# Patient Record
Sex: FEMALE | Race: WHITE | NOT HISPANIC OR LATINO | Employment: OTHER | URBAN - METROPOLITAN AREA
[De-identification: names, ages, dates, MRNs, and addresses within clinical notes are randomized per-mention and may not be internally consistent; named-entity substitution may affect disease eponyms.]

---

## 2019-10-30 ENCOUNTER — OFFICE VISIT (OUTPATIENT)
Dept: GASTROENTEROLOGY | Facility: CLINIC | Age: 64
End: 2019-10-30
Payer: MEDICARE

## 2019-10-30 VITALS
HEART RATE: 70 BPM | WEIGHT: 167 LBS | DIASTOLIC BLOOD PRESSURE: 84 MMHG | HEIGHT: 66 IN | SYSTOLIC BLOOD PRESSURE: 130 MMHG | BODY MASS INDEX: 26.84 KG/M2

## 2019-10-30 DIAGNOSIS — K64.9 HEMORRHOIDS, UNSPECIFIED HEMORRHOID TYPE: Primary | ICD-10-CM

## 2019-10-30 DIAGNOSIS — K62.5 RECTAL BLEEDING: ICD-10-CM

## 2019-10-30 DIAGNOSIS — Z86.010 HISTORY OF COLON POLYPS: ICD-10-CM

## 2019-10-30 PROBLEM — Z86.0100 HISTORY OF COLON POLYPS: Status: ACTIVE | Noted: 2019-10-30

## 2019-10-30 PROCEDURE — 99214 OFFICE O/P EST MOD 30 MIN: CPT | Performed by: INTERNAL MEDICINE

## 2019-10-30 NOTE — LETTER
October 30, 2019     Lobo Hicks, [address] [facility] [address] [facility]    Patient: Devin Dee   YOB: 1955   Date of Visit: 10/30/2019       Dear Dr Rosa Brown: Thank you for referring Rhina Corbett to me for evaluation  Below are my notes for this consultation  If you have questions, please do not hesitate to call me  I look forward to following your patient along with you  Sincerely,        Florida Beauchamp MD        CC: No Recipients  Florida Beauchamp MD  10/30/2019  9:56 AM  Incomplete  [address] [facility] - Outpatient Follow-up Note  Devin Cea 75 y o  female MRN: [id number]  Encounter: [id number]    ASSESSMENT AND PLAN:      1  Hemorrhoids, unspecified hemorrhoid type  76-year-old female with rectal bleeding, likely secondary to hemorrhoids given her colonoscopy September 2018     - high-fiber diet, increased water intake  - I discussed with the patient the different options including conservative management with local creams versus surgery versus hemorrhoid banding  Patient would like to try the conservative management 1st     - Hydrocortisone (PROCTO-CATRACHO) 1 % CREA; Insert into the rectum 3 (three) times a day for 10 days  Dispense: 28 4 g; Refill: 2    2  Rectal bleeding    3  History of colon polyps  History of colon polyps  Last colonoscopy in September 27, 2018 negative for polyps  Five year recall, September 2023  Followup Appointment:  HILLARY hernández   ______________________________________________________________________    Chief Complaint   Patient presents with    Hemorrhoids     HPI:  76-year-old female here today for rectal bleeding  Last evaluated by our group in September 2018 for colonoscopy, personal history of colon polyps  Patient states that the rectal bleeding has been intermittent  She has gained some weight recently which she thinks is worsening her bleeding  Denies any constipation or diarrhea    Denies any harsh chemicals in the area  When the bleeding happens, usually last couple days  No abdominal pain  Review of systems significant for weight gain and rectal bleeding  All other systems negative  Historical Information   Past Medical History:   Diagnosis Date    Colon polyp     Hemorrhoids      Past Surgical History:   Procedure Laterality Date    CHOLECYSTECTOMY      COLONOSCOPY  09/27/2018    MOHS SURGERY      TUBAL LIGATION       Social History     Substance and Sexual Activity   Alcohol Use Yes    Frequency: 4 or more times a week     Social History     Substance and Sexual Activity   Drug Use Never     Social History     Tobacco Use   Smoking Status Never Smoker   Smokeless Tobacco Never Used     Family History   Problem Relation Age of Onset    Stroke Mother     Cancer Father     Stomach cancer Father     Colon cancer Neg Hx     Colon polyps Neg Hx          Current Outpatient Medications:     Hydrocortisone (PROCTO-CATRACHO) 1 % CREA  No Known Allergies  Reviewed medications and allergies and updated as indicated    PHYSICAL EXAM:    Blood pressure 130/84, pulse 70, height 5' 6" (1 676 m), weight 75 8 kg (167 lb)  Body mass index is 26 95 kg/m²  General Appearance: NAD, cooperative, alert  Eyes: Anicteric, PERRLA, EOMI  ENT:  Normocephalic, atraumatic, normal mucosa  Neck:  Supple, symmetrical, trachea midline  Resp:  Clear to auscultation bilaterally; no rales, rhonchi or wheezing; respirations unlabored   CV:  S1 S2, Regular rate and rhythm; no murmur, rub, or gallop  GI:  Soft, non-tender, non-distended; normal bowel sounds; no masses, no organomegaly   Rectal:  Grade 2 hemorrhoids  Musculoskeletal: No cyanosis, clubbing or edema  Normal ROM  Skin:  No jaundice, rashes, or lesions   Heme/Lymph: No palpable cervical lymphadenopathy  Psych: Normal affect, good eye contact  Neuro: No gross deficits, AAOx3    Lab Results:   Labs reviewed October 24th 2019  Normal TSH, CMP,   Negative hepatitis panel including hepatitis C antibody  Radiology Results:   No results found

## 2019-10-30 NOTE — PROGRESS NOTES
[address] [facility] - Outpatient Follow-up Note  Becky Addison 75 y o  female MRN: [id number]  Encounter: [id number]    ASSESSMENT AND PLAN:      1  Hemorrhoids, unspecified hemorrhoid type  47-year-old female with rectal bleeding, likely secondary to hemorrhoids given her colonoscopy September 2018     - high-fiber diet, increased water intake  - I discussed with the patient the different options including conservative management with local creams versus surgery versus hemorrhoid banding  Patient would like to try the conservative management 1st     - Hydrocortisone (PROCTO-CATRACHO) 1 % CREA; Insert into the rectum 3 (three) times a day for 10 days  Dispense: 28 4 g; Refill: 2    2  Rectal bleeding    3  History of colon polyps  History of colon polyps  Last colonoscopy in September 27, 2018 negative for polyps  Five year recall, September 2023  Followup Appointment:  HILLARY hernández   ______________________________________________________________________    Chief Complaint   Patient presents with    Hemorrhoids     HPI:  47-year-old female here today for rectal bleeding  Last evaluated by our group in September 2018 for colonoscopy, personal history of colon polyps  Patient states that the rectal bleeding has been intermittent  She has gained some weight recently which she thinks is worsening her bleeding  Denies any constipation or diarrhea  Denies any harsh chemicals in the area  When the bleeding happens, usually last couple days  No abdominal pain  Review of systems significant for weight gain and rectal bleeding  All other systems negative      Historical Information   Past Medical History:   Diagnosis Date    Colon polyp     Hemorrhoids      Past Surgical History:   Procedure Laterality Date    CHOLECYSTECTOMY      COLONOSCOPY  09/27/2018    MOHS SURGERY      TUBAL LIGATION       Social History     Substance and Sexual Activity   Alcohol Use Yes    Frequency: 4 or more times a week     Social History     Substance and Sexual Activity   Drug Use Never     Social History     Tobacco Use   Smoking Status Never Smoker   Smokeless Tobacco Never Used     Family History   Problem Relation Age of Onset    Stroke Mother     Cancer Father     Stomach cancer Father     Colon cancer Neg Hx     Colon polyps Neg Hx          Current Outpatient Medications:     Hydrocortisone (PROCTO-CATRACHO) 1 % CREA  No Known Allergies  Reviewed medications and allergies and updated as indicated    PHYSICAL EXAM:    Blood pressure 130/84, pulse 70, height 5' 6" (1 676 m), weight 75 8 kg (167 lb)  Body mass index is 26 95 kg/m²  General Appearance: NAD, cooperative, alert  Eyes: Anicteric, PERRLA, EOMI  ENT:  Normocephalic, atraumatic, normal mucosa  Neck:  Supple, symmetrical, trachea midline  Resp:  Clear to auscultation bilaterally; no rales, rhonchi or wheezing; respirations unlabored   CV:  S1 S2, Regular rate and rhythm; no murmur, rub, or gallop  GI:  Soft, non-tender, non-distended; normal bowel sounds; no masses, no organomegaly   Rectal:  Grade 2 hemorrhoids  Musculoskeletal: No cyanosis, clubbing or edema  Normal ROM  Skin:  No jaundice, rashes, or lesions   Heme/Lymph: No palpable cervical lymphadenopathy  Psych: Normal affect, good eye contact  Neuro: No gross deficits, AAOx3    Lab Results:   Labs reviewed October 24th 2019  Normal TSH, CMP,   Negative hepatitis panel including hepatitis C antibody  Radiology Results:   No results found

## 2020-02-19 ENCOUNTER — OFFICE VISIT (OUTPATIENT)
Dept: GASTROENTEROLOGY | Facility: CLINIC | Age: 65
End: 2020-02-19
Payer: MEDICARE

## 2020-02-19 VITALS
SYSTOLIC BLOOD PRESSURE: 120 MMHG | HEART RATE: 70 BPM | HEIGHT: 66 IN | WEIGHT: 167 LBS | DIASTOLIC BLOOD PRESSURE: 80 MMHG | BODY MASS INDEX: 26.84 KG/M2

## 2020-02-19 DIAGNOSIS — K64.9 HEMORRHOIDS, UNSPECIFIED HEMORRHOID TYPE: Primary | ICD-10-CM

## 2020-02-19 PROCEDURE — 46221 LIGATION OF HEMORRHOID(S): CPT | Performed by: INTERNAL MEDICINE

## 2020-02-19 NOTE — LETTER
February 19, 2020     [address]    Patient: Holland Arauz   YOB: 1955   Date of Visit: 2/19/2020       Dear Dr Jazmin Saavedra: Thank you for referring Cindy Alexander to me for evaluation  Below are my notes for this consultation  If you have questions, please do not hesitate to call me  I look forward to following your patient along with you  Sincerely,        Jareth Freeman MD        CC: No Recipients  Jareth Freeman MD  2/19/2020  3:57 PM  Sign at close encounter  [address] [facility] - [address] [facility] Rattie 75 y o  female MRN: [id number]  Encounter: [id number]    ASSESSMENT AND PLAN:    The left lateral hemorrhoid area was banded today  The patient was instructed to avoid constipation and straining, and educated in appropriate fiber intake  HPI: Holland Arauz is a 75y o  year old female who presents with rectal bleeding due to hemorrhoids       Previous treatments include: prep H  Bands were previously placed: none   Current Fiber intake: dietary  Complications of prior therapy include: none    The patient provided consent for banding  and was placed in the left lateral position  Rectal exam showed grade 3 hemorrhoids with mild rectal prolapse  The [place] ligator was advanced and a band was applied without difficulty   Repeat rectal exam confirmed appropriate placement  The patient was discharged home after observation in the waiting area  The exam was chaperoned by MA

## 2020-02-19 NOTE — PATIENT INSTRUCTIONS
Hemorrhoids   WHAT YOU NEED TO KNOW:   Hemorrhoids are swollen blood vessels inside your rectum (internal hemorrhoids) or on your anus (external hemorrhoids)  Sometimes a hemorrhoid may prolapse  This means it extends out of your anus  DISCHARGE INSTRUCTIONS:   Return to the emergency department if:   · You have severe pain in your rectum or around your anus  · You have severe pain in your abdomen and you are vomiting  · You have bleeding from your anus that soaks through your underwear  Contact your healthcare provider if:   · You have frequent and painful bowel movements  · Your hemorrhoid looks or feels more swollen than usual      · You do not have a bowel movement for 2 days or more  · You see or feel tissue coming through your anus  · You have questions or concerns about your condition or care  Medicines: You may  need any of the following:  · A pad, cream, or ointment  can help decrease pain, swelling, and itching  · Stool softeners  help treat or prevent constipation  · NSAIDs , such as ibuprofen, help decrease swelling, pain, and fever  NSAIDs can cause stomach bleeding or kidney problems in certain people  If you take blood thinner medicine, always ask your healthcare provider if NSAIDs are safe for you  Always read the medicine label and follow directions  · Take your medicine as directed  Contact your healthcare provider if you think your medicine is not helping or if you have side effects  Tell him or her if you are allergic to any medicine  Keep a list of the medicines, vitamins, and herbs you take  Include the amounts, and when and why you take them  Bring the list or the pill bottles to follow-up visits  Carry your medicine list with you in case of an emergency  Manage your symptoms:   · Apply ice on your anus for 15 to 20 minutes every hour or as directed  Use an ice pack, or put crushed ice in a plastic bag   Cover it with a towel before you apply it to your anus  Ice helps prevent tissue damage and decreases swelling and pain  · Take a sitz bath  Fill a bathtub with 4 to 6 inches of warm water  You may also use a sitz bath pan that fits inside a toilet bowl  Sit in the sitz bath for 15 minutes  Do this 3 times a day, and after each bowel movement  The warm water can help decrease pain and swelling  · Keep your anal area clean  Gently wash the area with warm water daily  Soap may irritate the area  After a bowel movement, wipe with moist towelettes or wet toilet paper  Dry toilet paper can irritate the area  Prevent hemorrhoids:   · Do not strain to have a bowel movement  Do not sit on the toilet too long  These actions can increase pressure on the tissues in your rectum and anus  · Drink plenty of liquids  Liquids can help prevent constipation  Ask how much liquid to drink each day and which liquids are best for you  · Eat a variety of high-fiber foods  Examples include fruits, vegetables, and whole grains  Ask your healthcare provider how much fiber you need each day  You may need to take a fiber supplement  · Exercise as directed  Exercise, such as walking, may make it easier to have a bowel movement  Ask your healthcare provider to help you create an exercise plan  · Do not have anal sex  Anal sex can weaken the skin around your rectum and anus  · Avoid heavy lifting  This can cause straining and increase your risk for another hemorrhoid  Follow up with your healthcare provider as directed:  Write down your questions so you remember to ask them during your visits  © 2017 [address] [facility] Information is for End User's use only and may not be sold, redistributed or otherwise used for commercial purposes  All illustrations and images included in CareNotes® are the copyrighted property of [employer] , [employer]  or Farhat Welch  The above information is an  only   It is not intended as medical advice for individual conditions or treatments  Talk to your doctor, nurse or pharmacist before following any medical regimen to see if it is safe and effective for you

## 2020-02-19 NOTE — PROGRESS NOTES
[address] [facility] - [address]  [facility] 75 y o  female MRN: [id number]  Encounter: [id number]    ASSESSMENT AND PLAN:    The left lateral hemorrhoid area was banded today  The patient was instructed to avoid constipation and straining, and educated in appropriate fiber intake  HPI: Mary Moyer is a 75y o  year old female who presents with rectal bleeding due to hemorrhoids       Previous treatments include: prep H  Bands were previously placed: none   Current Fiber intake: dietary  Complications of prior therapy include: none    The patient provided consent for banding  and was placed in the left lateral position  Rectal exam showed grade 3 hemorrhoids with mild rectal prolapse  The O'[place]van ligator was advanced and a band was applied without difficulty   Repeat rectal exam confirmed appropriate placement  The patient was discharged home after observation in the waiting area  The exam was chaperoned by MA

## 2020-03-10 ENCOUNTER — OFFICE VISIT (OUTPATIENT)
Dept: GASTROENTEROLOGY | Facility: CLINIC | Age: 65
End: 2020-03-10
Payer: MEDICARE

## 2020-03-10 VITALS
HEART RATE: 67 BPM | WEIGHT: 167 LBS | HEIGHT: 66 IN | BODY MASS INDEX: 26.84 KG/M2 | SYSTOLIC BLOOD PRESSURE: 134 MMHG | DIASTOLIC BLOOD PRESSURE: 94 MMHG

## 2020-03-10 DIAGNOSIS — K64.9 HEMORRHOIDS, UNSPECIFIED HEMORRHOID TYPE: Primary | ICD-10-CM

## 2020-03-10 PROCEDURE — 46221 LIGATION OF HEMORRHOID(S): CPT | Performed by: INTERNAL MEDICINE

## 2020-03-10 NOTE — PATIENT INSTRUCTIONS
Hemorrhoids   WHAT YOU NEED TO KNOW:   Hemorrhoids are swollen blood vessels inside your rectum (internal hemorrhoids) or on your anus (external hemorrhoids)  Sometimes a hemorrhoid may prolapse  This means it extends out of your anus  DISCHARGE INSTRUCTIONS:   Return to the emergency department if:   · You have severe pain in your rectum or around your anus  · You have severe pain in your abdomen and you are vomiting  · You have bleeding from your anus that soaks through your underwear  Contact your healthcare provider if:   · You have frequent and painful bowel movements  · Your hemorrhoid looks or feels more swollen than usual      · You do not have a bowel movement for 2 days or more  · You see or feel tissue coming through your anus  · You have questions or concerns about your condition or care  Medicines: You may  need any of the following:  · A pad, cream, or ointment  can help decrease pain, swelling, and itching  · Stool softeners  help treat or prevent constipation  · NSAIDs , such as ibuprofen, help decrease swelling, pain, and fever  NSAIDs can cause stomach bleeding or kidney problems in certain people  If you take blood thinner medicine, always ask your healthcare provider if NSAIDs are safe for you  Always read the medicine label and follow directions  · Take your medicine as directed  Contact your healthcare provider if you think your medicine is not helping or if you have side effects  Tell him or her if you are allergic to any medicine  Keep a list of the medicines, vitamins, and herbs you take  Include the amounts, and when and why you take them  Bring the list or the pill bottles to follow-up visits  Carry your medicine list with you in case of an emergency  Manage your symptoms:   · Apply ice on your anus for 15 to 20 minutes every hour or as directed  Use an ice pack, or put crushed ice in a plastic bag   Cover it with a towel before you apply it to your anus  Ice helps prevent tissue damage and decreases swelling and pain  · Take a sitz bath  Fill a bathtub with 4 to 6 inches of warm water  You may also use a sitz bath pan that fits inside a toilet bowl  Sit in the sitz bath for 15 minutes  Do this 3 times a day, and after each bowel movement  The warm water can help decrease pain and swelling  · Keep your anal area clean  Gently wash the area with warm water daily  Soap may irritate the area  After a bowel movement, wipe with moist towelettes or wet toilet paper  Dry toilet paper can irritate the area  Prevent hemorrhoids:   · Do not strain to have a bowel movement  Do not sit on the toilet too long  These actions can increase pressure on the tissues in your rectum and anus  · Drink plenty of liquids  Liquids can help prevent constipation  Ask how much liquid to drink each day and which liquids are best for you  · Eat a variety of high-fiber foods  Examples include fruits, vegetables, and whole grains  Ask your healthcare provider how much fiber you need each day  You may need to take a fiber supplement  · Exercise as directed  Exercise, such as walking, may make it easier to have a bowel movement  Ask your healthcare provider to help you create an exercise plan  · Do not have anal sex  Anal sex can weaken the skin around your rectum and anus  · Avoid heavy lifting  This can cause straining and increase your risk for another hemorrhoid  Follow up with your healthcare provider as directed:  Write down your questions so you remember to ask them during your visits  © 2017 [address] [facility] Information is for End User's use only and may not be sold, redistributed or otherwise used for commercial purposes  All illustrations and images included in CareNotes® are the copyrighted property of [employer] , [employer]  or Farhat Welch  The above information is an  only   It is not intended as medical advice for individual conditions or treatments  Talk to your doctor, nurse or pharmacist before following any medical regimen to see if it is safe and effective for you

## 2020-03-10 NOTE — PROGRESS NOTES
[facility] - [address] [place] [address] Char 75 y o  female MRN: [id number]  Encounter: [id number]    ASSESSMENT AND PLAN:    The right anterior hemorrhoid area was banded today  The patient was instructed to avoid constipation and straining, and educated in appropriate fiber intake  HPI: Hasmukh Licona is a 75y o  year old female who presents with rectal bleeding due to hemorrhoids       Previous treatments include:  Preparation H  Bands were previously placed:  Left lateral   Current Fiber intake:  Dietary  Complications of prior therapy include:  None    The patient provided consent for banding  and was placed in the left lateral position  Rectal exam showed grade 2 hemorrhoids  The O'Ivan ligator was advanced and a band was applied without difficulty   Repeat rectal exam confirmed appropriate placement  The patient was discharged home after observation in the waiting area  The exam was chaperoned by MA

## 2020-03-10 NOTE — LETTER
March 10, 2020     [address]    Patient: Natalia Bender   YOB: 1955   Date of Visit: 3/10/2020       Dear Dr Catracho Walsh: Thank you for referring Shane Brumfield to me for evaluation  Below are my notes for this consultation  If you have questions, please do not hesitate to call me  I look forward to following your patient along with you  Sincerely,        Emanuel Pennington MD        CC: No Recipients  Emanuel Pennington MD  3/10/2020  1:32 PM  Signed  [address] [facility] - [address] [place] [address] 75 y o  female MRN: [id number]  Encounter: [id number]    ASSESSMENT AND PLAN:    The right anterior hemorrhoid area was banded today  The patient was instructed to avoid constipation and straining, and educated in appropriate fiber intake  HPI: Natalia Bender is a 75y o  year old female who presents with rectal bleeding due to hemorrhoids       Previous treatments include:  Preparation H  Bands were previously placed:  Left lateral   Current Fiber intake:  Dietary  Complications of prior therapy include:  None    The patient provided consent for banding  and was placed in the left lateral position  Rectal exam showed grade 2 hemorrhoids  The O'Ivan ligator was advanced and a band was applied without difficulty   Repeat rectal exam confirmed appropriate placement  The patient was discharged home after observation in the waiting area  The exam was chaperoned by MA

## 2020-05-11 ENCOUNTER — OFFICE VISIT (OUTPATIENT)
Dept: GASTROENTEROLOGY | Facility: CLINIC | Age: 65
End: 2020-05-11
Payer: MEDICARE

## 2020-05-11 VITALS
SYSTOLIC BLOOD PRESSURE: 134 MMHG | HEART RATE: 87 BPM | DIASTOLIC BLOOD PRESSURE: 86 MMHG | BODY MASS INDEX: 26.03 KG/M2 | HEIGHT: 66 IN | WEIGHT: 162 LBS

## 2020-05-11 DIAGNOSIS — K64.9 HEMORRHOIDS, UNSPECIFIED HEMORRHOID TYPE: Primary | ICD-10-CM

## 2020-05-11 PROCEDURE — 46221 LIGATION OF HEMORRHOID(S): CPT | Performed by: INTERNAL MEDICINE

## 2020-08-12 ENCOUNTER — OFFICE VISIT (OUTPATIENT)
Dept: GASTROENTEROLOGY | Facility: CLINIC | Age: 65
End: 2020-08-12
Payer: MEDICARE

## 2020-08-12 VITALS
DIASTOLIC BLOOD PRESSURE: 72 MMHG | HEIGHT: 66 IN | WEIGHT: 160 LBS | SYSTOLIC BLOOD PRESSURE: 120 MMHG | BODY MASS INDEX: 25.71 KG/M2 | TEMPERATURE: 97.7 F | HEART RATE: 76 BPM

## 2020-08-12 DIAGNOSIS — Z86.010 HISTORY OF COLON POLYPS: ICD-10-CM

## 2020-08-12 DIAGNOSIS — K64.8 OTHER HEMORRHOIDS: Primary | ICD-10-CM

## 2020-08-12 DIAGNOSIS — K62.5 RECTAL BLEEDING: ICD-10-CM

## 2020-08-12 PROCEDURE — 99213 OFFICE O/P EST LOW 20 MIN: CPT | Performed by: INTERNAL MEDICINE

## 2020-08-12 NOTE — LETTER
August 12, 2020     W[address]y [address]    Patient: Axel Blake   YOB: 1955   Date of Visit: 8/12/2020       Dear Dr Michael Saha: Thank you for referring Girma Pace to me for evaluation  Below are my notes for this consultation  If you have questions, please do not hesitate to call me  I look forward to following your patient along with you  Sincerely,        Falguni Schuster MD        CC: No Recipients  Falguni Schuster MD  8/12/2020 12:11 PM  Sign when Signing Visit  [place] [address] [facility] - Outpatient Follow-up Note  Axel Blake 77 y o  female MRN: [id number]  Encounter: [id number]    ASSESSMENT AND PLAN:      1  Other hemorrhoids  24-year-old female here today for follow-up  Rectal bleeding has significantly improved after banding all 3 hemorrhoids  We emphasized importance of good total history habits, increasing fiber and water, avoiding straining and pushing  2  Rectal bleeding  due to hemorrhoids, status post banding of all 3 columns    3  History of colon polyps  History of colon polyps, last colonoscopy in September 2018  Recall September 2023      Followup Appointment:  HILLARY hernández   ______________________________________________________________________    Chief Complaint   Patient presents with    Follow-up     Hemorrhoids     HPI:  24-year-old female here today for follow-up post procedures of hemorrhoid banding  Doing better  Did see couple small red blood on the toilet paper after wiping  Overall though doing well  Stools are soft  Tries not to strain or push  No bleeding otherwise  No abdominal pains  Weight is stable  Trying to add fiber to her diet      Historical Information   Past Medical History:   Diagnosis Date    Colon polyp     Hemorrhoids      Past Surgical History:   Procedure Laterality Date    CHOLECYSTECTOMY      COLONOSCOPY  09/27/2018    MOHS SURGERY      TUBAL LIGATION Social History     Substance and Sexual Activity   Alcohol Use Yes    Frequency: 4 or more times a week     Social History     Substance and Sexual Activity   Drug Use Never     Social History     Tobacco Use   Smoking Status Former Smoker   Smokeless Tobacco Never Used     Family History   Problem Relation Age of Onset    Stroke Mother     Cancer Father     Stomach cancer Father     Colon cancer Neg Hx     Colon polyps Neg Hx          Current Outpatient Medications:     Hydrocortisone (PROCTO-CATRACHO) 1 % CREA  No Known Allergies  Reviewed medications and allergies and updated as indicated    PHYSICAL EXAM:    Blood pressure 120/72, pulse 76, temperature 97 7 °F (36 5 °C), height 5' 6" (1 676 m), weight 72 6 kg (160 lb)  Body mass index is 25 82 kg/m²  General Appearance: NAD, cooperative, alert  Eyes: Anicteric, PERRLA, EOMI  ENT:  Normocephalic, atraumatic, normal mucosa  Neck:  Supple, symmetrical, trachea midline  Resp:  Clear to auscultation bilaterally; no rales, rhonchi or wheezing; respirations unlabored   CV:  S1 S2, Regular rate and rhythm; no murmur, rub, or gallop  GI:  Soft, non-tender, non-distended; normal bowel sounds; no masses, no organomegaly   Rectal: Deferred  Musculoskeletal: No cyanosis, clubbing or edema  Normal ROM    Skin:  No jaundice, rashes, or lesions   Heme/Lymph: No palpable cervical lymphadenopathy  Psych: Normal affect, good eye contact  Neuro: No gross deficits, AAOx3

## 2020-08-12 NOTE — PROGRESS NOTES
[id number] - Outpatient Follow-up Note  Norm Mor 77 y o  female MRN: [id number]  Encounter: [id number]    ASSESSMENT AND PLAN:      1  Other hemorrhoids  25-year-old female here today for follow-up  Rectal bleeding has significantly improved after banding all 3 hemorrhoids  We emphasized importance of good total history habits, increasing fiber and water, avoiding straining and pushing  2  Rectal bleeding  due to hemorrhoids, status post banding of all 3 columns    3  History of colon polyps  History of colon polyps, last colonoscopy in September 2018  Recall September 2023      Followup Appointment:  [facility] judd n   ______________________________________________________________________    Chief Complaint   Patient presents with    Follow-up     Hemorrhoids     HPI:  25-year-old female here today for follow-up post procedures of hemorrhoid banding  Doing better  Did see couple small red blood on the toilet paper after wiping  Overall though doing well  Stools are soft  Tries not to strain or push  No bleeding otherwise  No abdominal pains  Weight is stable  Trying to add fiber to her diet      Historical Information   Past Medical History:   Diagnosis Date    Colon polyp     Hemorrhoids      Past Surgical History:   Procedure Laterality Date    CHOLECYSTECTOMY      COLONOSCOPY  09/27/2018    MOHS SURGERY      TUBAL LIGATION       Social History     Substance and Sexual Activity   Alcohol Use Yes    Frequency: 4 or more times a week     Social History     Substance and Sexual Activity   Drug Use Never     Social History     Tobacco Use   Smoking Status Former Smoker   Smokeless Tobacco Never Used     Family History   Problem Relation Age of Onset    Stroke Mother     Cancer Father     Stomach cancer Father     Colon cancer Neg Hx     Colon polyps Neg Hx          Current Outpatient Medications:     Hydrocortisone (PROCTO-CATRACHO) 1 % CREA  No Known Allergies  Reviewed medications and allergies and updated as indicated    PHYSICAL EXAM:    Blood pressure 120/72, pulse 76, temperature 97 7 °F (36 5 °C), height 5' 6" (1 676 m), weight 72 6 kg (160 lb)  Body mass index is 25 82 kg/m²  General Appearance: NAD, cooperative, alert  Eyes: Anicteric, PERRLA, EOMI  ENT:  Normocephalic, atraumatic, normal mucosa  Neck:  Supple, symmetrical, trachea midline  Resp:  Clear to auscultation bilaterally; no rales, rhonchi or wheezing; respirations unlabored   CV:  S1 S2, Regular rate and rhythm; no murmur, rub, or gallop  GI:  Soft, non-tender, non-distended; normal bowel sounds; no masses, no organomegaly   Rectal: Deferred  Musculoskeletal: No cyanosis, clubbing or edema  Normal ROM    Skin:  No jaundice, rashes, or lesions   Heme/Lymph: No palpable cervical lymphadenopathy  Psych: Normal affect, good eye contact  Neuro: No gross deficits, AAOx3

## 2021-03-04 DIAGNOSIS — Z23 ENCOUNTER FOR IMMUNIZATION: ICD-10-CM

## 2021-04-02 ENCOUNTER — TELEPHONE (OUTPATIENT)
Dept: GASTROENTEROLOGY | Facility: CLINIC | Age: 66
End: 2021-04-02

## 2021-04-02 NOTE — TELEPHONE ENCOUNTER
Pt left [facility] stating a few mos ago GS did banding  Today she had BM w/ a toilet full of blood; is unsure what to do  # [phone]

## 2021-04-02 NOTE — TELEPHONE ENCOUNTER
Pt has hx of banding in the past   She did have another BM today and there was no blood  Bright red blood in toilet, not in stool  Initially some rectal pain when BM started and beginning part of stool was hard but then soft  No fever, chills, nausea, vomiting  Not dizzy, lightheaded, or feeling  faint  No change in bowel habits  No change in urination  Advised to try stool softener  Advised when to go to ER and/or call on call doctor  Patient has appt in June  Advised if this occurs again or symptoms worsen she needs to let us know

## 2021-04-13 ENCOUNTER — TELEPHONE (OUTPATIENT)
Dept: GASTROENTEROLOGY | Facility: CLINIC | Age: 66
End: 2021-04-13

## 2021-04-13 NOTE — TELEPHONE ENCOUNTER
Spoke w pt  Advised patient tries some high fiber diet, adding Miralax 17g daily and f/u as planned

## 2021-04-13 NOTE — TELEPHONE ENCOUNTER
Spoke with patient on cell [phone]  Had tried prunes as advised and this did improve the constipation  Not on any stool softeners or other laxatives  No bleeding  Stools are now "narrow"  Does have some generalized abdominal pain/cramping worse in the morning  Her appointment is not until June for f/u  Please advise

## 2021-04-13 NOTE — TELEPHONE ENCOUNTER
Pt called back today  She she is not having any rectal bleeding  However she is now having pencil like stools  She does have some pain and cramping  But its not extreme

## 2021-04-14 NOTE — TELEPHONE ENCOUNTER
Pt left VM stating she spoke w/ GS yesterday and swore she said she should take one tablet of Miralax at night; checked but does not come in tabs only powder  [phone]

## 2021-04-14 NOTE — TELEPHONE ENCOUNTER
I returned call and spoke with patient reviewing that MiraLax is a powder and not a tablet  Patient will comply

## 2021-04-28 NOTE — TELEPHONE ENCOUNTER
Pt left  mssg stating GS told her to take laxative every day; knows she's not supposed to take it for long/questions how long she should have taken?/is all out; didn't notice much of a difference on days she took it from days she didn't  # [phone]

## 2021-04-28 NOTE — TELEPHONE ENCOUNTER
I contacted patient  She is no longer having issues with constipation but she is still concerned that her stools are thin, narrow (not her usual)  She is concerned because she researched the internet  She would like your input  I did advise we will get back to her tomorrow  I did advise that she does not need to continue with daily MiraLax, she can use prn

## 2021-04-29 NOTE — TELEPHONE ENCOUNTER
I spoke with patient and conveyed GS recommendation to continue on MiraLax daily and she will address/answer her concerns at Yu office visit

## 2021-06-16 ENCOUNTER — CONSULT (OUTPATIENT)
Dept: GASTROENTEROLOGY | Facility: CLINIC | Age: 66
End: 2021-06-16
Payer: MEDICARE

## 2021-06-16 ENCOUNTER — TELEPHONE (OUTPATIENT)
Dept: GASTROENTEROLOGY | Facility: CLINIC | Age: 66
End: 2021-06-16

## 2021-06-16 VITALS
WEIGHT: 163 LBS | BODY MASS INDEX: 26.2 KG/M2 | HEIGHT: 66 IN | SYSTOLIC BLOOD PRESSURE: 124 MMHG | DIASTOLIC BLOOD PRESSURE: 78 MMHG

## 2021-06-16 DIAGNOSIS — R19.4 CHANGE IN BOWEL HABITS: Primary | ICD-10-CM

## 2021-06-16 DIAGNOSIS — K76.89 LIVER CYST: ICD-10-CM

## 2021-06-16 DIAGNOSIS — Z86.010 HISTORY OF COLON POLYPS: ICD-10-CM

## 2021-06-16 DIAGNOSIS — K76.0 FATTY LIVER: ICD-10-CM

## 2021-06-16 DIAGNOSIS — R12 HEARTBURN: ICD-10-CM

## 2021-06-16 PROCEDURE — 99214 OFFICE O/P EST MOD 30 MIN: CPT | Performed by: INTERNAL MEDICINE

## 2021-06-16 RX ORDER — POLYETHYLENE GLYCOL 3350 17 G/17G
17 POWDER, FOR SOLUTION ORAL DAILY PRN
COMMUNITY

## 2021-06-16 NOTE — H&P (VIEW-ONLY)
[id number] - Outpatient Follow-up Note  Goldie Locke 77 y o  female MRN: [id number]  Encounter: [id number]    ASSESSMENT AND PLAN:      1  Change in bowel habits  65F referred to us from Dr Christel Noel for changes in bowel habits and heartburn  Main issue seems to be that she has "pencil thin stools" at times w rectal bleeding  Other times no issues  Probably some constipation, but will proceed with colonoscopy to further assess  Continue Miralax daily     - Schedule Colonoscopy @ [address]  2  Heartburn  Intermittent issues requiring 6 weeks of PPI therapy  Currently feeling better off PPI  However, this has been going on for many years  - GERD lifestyle modifications  - Schedule EGD @ [address] to rule out Barretts  3  Liver cyst  Noted on US previously  Will follow up in 6 months  Likely benign     - US abdomen limited; Future    4  Fatty liver  Noted on US  Weight loss and limit ETOH intake  - Comprehensive metabolic panel; Future  - HCV FIBROSURE; Future  - Hepatitis C antibody; Future    - vitamin E 600 UNIT capsule; Take 1 capsule (600 Units total) by mouth daily  Dispense: 30 capsule; Refill: 11    5  History of colon polyps  Due 9/2023      Followup Appointment: 6 months  ______________________________________________________________________    Chief Complaint   Patient presents with    narrow stools    Heartburn     HPI:  65F here today at the request of Dr Christel Noel for change in bowel habits and heartburn  Pt states recently she's noticed some bowel movements that are harder to pass with rectal bleeding  She has known hemorrhoids so initially assumed that to be the case  However, then she started having sensation of incomplete bowel movements with pencil thin stools  No weight loss  No diarrhea  No abd pain/n/v  She also has had several episodes of heartburn requiring PPI or H2RA trials  Recently completed a 6 week course w improvement in her symptoms  Never had an EGD  No dysphagia/odynophagia  No N/V/regurgitation  Historical Information   Past Medical History:   Diagnosis Date    Breast cancer ([address] )     Colon polyp     Fatty liver     GERD (gastroesophageal reflux disease)     Hemorrhoids     Hypertension     exercise induced, on no meds     Past Surgical History:   Procedure Laterality Date    BREAST LUMPECTOMY      CHOLECYSTECTOMY      COLONOSCOPY  09/27/2018    MOHS SURGERY      TUBAL LIGATION       Social History     Substance and Sexual Activity   Alcohol Use Yes     Social History     Substance and Sexual Activity   Drug Use Never     Social History     Tobacco Use   Smoking Status Former Smoker   Smokeless Tobacco Never Used     Family History   Problem Relation Age of Onset    Stroke Mother     Cancer Father     Stomach cancer Father     Colon cancer Neg Hx     Colon polyps Neg Hx          Current Outpatient Medications:     polyethylene glycol (MIRALAX) 17 g packet    vitamin E 600 UNIT capsule  No Known Allergies  Reviewed medications and allergies and updated as indicated    PHYSICAL EXAM:    Blood pressure 124/78, height 5' 6" (1 676 m), weight 73 9 kg (163 lb)  Body mass index is 26 31 kg/m²  General Appearance: NAD, cooperative, alert  Eyes: Anicteric, PERRLA, EOMI  ENT:  Normocephalic, atraumatic, normal mucosa  Neck:  Supple, symmetrical, trachea midline  Resp:  Clear to auscultation bilaterally; no rales, rhonchi or wheezing; respirations unlabored   CV:  S1 S2, Regular rate and rhythm; no murmur, rub, or gallop  GI:  Soft, non-tender, non-distended; normal bowel sounds; no masses, no organomegaly   Rectal: Deferred  Musculoskeletal: No cyanosis, clubbing or edema  Normal ROM    Skin:  No jaundice, rashes, or lesions   Heme/Lymph: No palpable cervical lymphadenopathy  Psych: Normal affect, good eye contact  Neuro: No gross deficits, AAOx3    Lab Results: Normal CMP/CBC from 8/2020    Radiology Results:   [address] 3/12/21 - liver cysts, some fatty liver

## 2021-06-16 NOTE — PROGRESS NOTES
[facility] - Outpatient Follow-up Note  Axel Blake 77 y o  female MRN: [id number]  Encounter: [id number]    ASSESSMENT AND PLAN:      1  Change in bowel habits  65F referred to us from Dr Michael Saha for changes in bowel habits and heartburn  Main issue seems to be that she has "pencil thin stools" at times w rectal bleeding  Other times no issues  Probably some constipation, but will proceed with colonoscopy to further assess  Continue Miralax daily     - Schedule Colonoscopy @ [address]  2  Heartburn  Intermittent issues requiring 6 weeks of PPI therapy  Currently feeling better off PPI  However, this has been going on for many years  - GERD lifestyle modifications  - Schedule EGD @ [address] to rule out Barretts  3  Liver cyst  Noted on US previously  Will follow up in 6 months  Likely benign     - US abdomen limited; Future    4  Fatty liver  Noted on US  Weight loss and limit ETOH intake  - Comprehensive metabolic panel; Future  - HCV FIBROSURE; Future  - Hepatitis C antibody; Future    - vitamin E 600 UNIT capsule; Take 1 capsule (600 Units total) by mouth daily  Dispense: 30 capsule; Refill: 11    5  History of colon polyps  Due 9/2023      Followup Appointment: 6 months  ______________________________________________________________________    Chief Complaint   Patient presents with    narrow stools    Heartburn     HPI:  65F here today at the request of Dr Michael Saha for change in bowel habits and heartburn  Pt states recently she's noticed some bowel movements that are harder to pass with rectal bleeding  She has known hemorrhoids so initially assumed that to be the case  However, then she started having sensation of incomplete bowel movements with pencil thin stools  No weight loss  No diarrhea  No abd pain/n/v  She also has had several episodes of heartburn requiring PPI or H2RA trials  Recently completed a 6 week course w improvement in her symptoms  Never had an EGD  No dysphagia/odynophagia  No N/V/regurgitation  Historical Information   Past Medical History:   Diagnosis Date    Breast cancer ([address] )     Colon polyp     Fatty liver     GERD (gastroesophageal reflux disease)     Hemorrhoids     Hypertension     exercise induced, on no meds     Past Surgical History:   Procedure Laterality Date    BREAST LUMPECTOMY      CHOLECYSTECTOMY      COLONOSCOPY  09/27/2018    MOHS SURGERY      TUBAL LIGATION       Social History     Substance and Sexual Activity   Alcohol Use Yes     Social History     Substance and Sexual Activity   Drug Use Never     Social History     Tobacco Use   Smoking Status Former Smoker   Smokeless Tobacco Never Used     Family History   Problem Relation Age of Onset    Stroke Mother     Cancer Father     Stomach cancer Father     Colon cancer Neg Hx     Colon polyps Neg Hx          Current Outpatient Medications:     polyethylene glycol (MIRALAX) 17 g packet    vitamin E 600 UNIT capsule  No Known Allergies  Reviewed medications and allergies and updated as indicated    PHYSICAL EXAM:    Blood pressure 124/78, height 5' 6" (1 676 m), weight 73 9 kg (163 lb)  Body mass index is 26 31 kg/m²  General Appearance: NAD, cooperative, alert  Eyes: Anicteric, PERRLA, EOMI  ENT:  Normocephalic, atraumatic, normal mucosa  Neck:  Supple, symmetrical, trachea midline  Resp:  Clear to auscultation bilaterally; no rales, rhonchi or wheezing; respirations unlabored   CV:  S1 S2, Regular rate and rhythm; no murmur, rub, or gallop  GI:  Soft, non-tender, non-distended; normal bowel sounds; no masses, no organomegaly   Rectal: Deferred  Musculoskeletal: No cyanosis, clubbing or edema  Normal ROM    Skin:  No jaundice, rashes, or lesions   Heme/Lymph: No palpable cervical lymphadenopathy  Psych: Normal affect, good eye contact  Neuro: No gross deficits, AAOx3    Lab Results: Normal CMP/CBC from 8/2020    Radiology Results:   [address] 3/12/21 - liver cysts, some fatty liver

## 2021-06-16 NOTE — PATIENT INSTRUCTIONS
Non-Alcoholic Fatty Liver Disease   WHAT YOU NEED TO KNOW:   Non-alcoholic fatty liver disease (NAFLD) is a buildup of fat in your liver from a condition other than alcoholism  DISCHARGE INSTRUCTIONS:   Medicines:   · Medicines  may be given to manage blood sugar or cholesterol levels  · Take your medicine as directed  Contact your healthcare provider if you think your medicine is not helping or if you have side effects  Tell him or her if you are allergic to any medicine  Keep a list of the medicines, vitamins, and herbs you take  Include the amounts, and when and why you take them  Bring the list or the pill bottles to follow-up visits  Carry your medicine list with you in case of an emergency  Follow up with your healthcare provider as directed: You may need to return for more tests  You may also be referred to a specialist  Write down your questions so you remember to ask them during your visits  Manage NAFLD:   · Maintain a healthy weight  Ask your healthcare provider how much you should weigh  Ask him to help you create a weight loss plan if you are overweight  · Exercise  Aerobic exercise 3 times a week for 20 to 45 minutes can help decrease fat buildup in your liver  Examples are cycling, brisk walking, and jogging  Ask your healthcare provider about the best exercise plan for you  · Eat healthy foods  Examples are vegetables, fruit, whole-grain breads, low-fat dairy products, beans, lean meats, and fish  Foods low in simple carbohydrates, high fructose corn syrup, and trans fat may help decrease fat buildup in your liver  · Do not drink alcohol  Alcohol may make NAFLD worse and harm your liver  Contact your healthcare provider if:   · You have increased pain or swelling in your abdomen  · You feel more tired than usual     · You bruise or bleed easily  · Your skin or the whites of your eyes look yellow      · You have questions or concerns about your condition or care     Return to the emergency department if:   · You have shortness of breath  · You have trouble thinking clearly or are confused  · You feel lightheaded or faint  · You have shaking, chills, and a fever  © Copyright [address] Information is for End User's use only and may not be sold, redistributed or otherwise used for commercial purposes  All illustrations and images included in CareNotes® are the copyrighted property of A D A M , Inc  or [address] [place], [place] [address]khloe Coffey   The above information is an  only  It is not intended as medical advice for individual conditions or treatments  Talk to your doctor, nurse or pharmacist before following any medical regimen to see if it is safe and effective for you

## 2021-06-16 NOTE — TELEPHONE ENCOUNTER
Why does your doctor want you to have this procedure? Change in bowel     Do you have kidney disease?  no  If yes, are you on dialysis :     Have you had diverticulitis within the past 2 months? no    Are you diabetic?  no  If yes, insulin dependent: If yes, provide diabetic instructions sheet     Do take iron supplements?  no  If yes, instruct patient to hold iron supplement for 7 days prior    Are you on a blood thinner? no   Was the blood thinner sheet complete and faxed to cardiologist no  Plavix (clopidogrel), Coumadin (warfarin), Lovenox (enoxaparin), Xarelto (rivaroxaban), Pradaxa(dabigatran), Eliquis(apixaban) Savaysa/Lixiana (edoxapan)    Do you have an automatic implantable cardiac defibrillator (AICD)/pacemaker ([facility])? no  Was AICD/pacemaker sheet completed and faxed to cardiologist? no    Are you on home oxygen? no  If yes, continuous or nocturnal:     Have you been treated for MRSA, VRE or any communicable diseases? no    Heart attack, stroke, or stent within 3 months? no  Schedule at Hospital if within 3-6 months   Use nitroglycerin for chest pain in the last 6 months? no    History of organ  transplant?  no   If yes, notify Endo      History of neck/throat/tongue surgery or cancer? no  IF yes, notify Endo      Any problems with anesthesia in the past? no     Was stool C diff ordered?  no Stool specimen needs to be completed prior to procedure    Do have any facial or body piercings?no     Do you have a latex allergy? no     Do have an allergy to metals? ([facility] study only) no     If pediatric patient, was consent faxed to pediatrician no     Patient rights reviewed yes        Miralax prep given and instructions emailed to patient

## 2021-06-18 ENCOUNTER — TELEPHONE (OUTPATIENT)
Dept: GASTROENTEROLOGY | Facility: CLINIC | Age: 66
End: 2021-06-18

## 2021-06-18 NOTE — TELEPHONE ENCOUNTER
Patient had visit with you 6/16/21  She has questions:   1  She takes multivitamin includes Vit # 15 8 mg, do you still want her to take additional as ordered? 2  Can she take the MiraLax as needed versus daily since she has frequently has loose stools? Reply can be sent via [address]

## 2021-07-06 ENCOUNTER — HOSPITAL ENCOUNTER (OUTPATIENT)
Dept: GASTROENTEROLOGY | Facility: AMBULATORY SURGERY CENTER | Age: 66
Discharge: HOME | End: 2021-07-06
Payer: MEDICARE

## 2021-07-06 ENCOUNTER — ANESTHESIA (OUTPATIENT)
Dept: GASTROENTEROLOGY | Facility: AMBULATORY SURGERY CENTER | Age: 66
End: 2021-07-06

## 2021-07-06 ENCOUNTER — ANESTHESIA EVENT (OUTPATIENT)
Dept: GASTROENTEROLOGY | Facility: AMBULATORY SURGERY CENTER | Age: 66
End: 2021-07-06

## 2021-07-06 VITALS
DIASTOLIC BLOOD PRESSURE: 66 MMHG | RESPIRATION RATE: 25 BRPM | TEMPERATURE: 98.4 F | BODY MASS INDEX: 25.82 KG/M2 | HEART RATE: 80 BPM | OXYGEN SATURATION: 99 % | WEIGHT: 160 LBS | SYSTOLIC BLOOD PRESSURE: 123 MMHG

## 2021-07-06 DIAGNOSIS — R12 HEARTBURN: ICD-10-CM

## 2021-07-06 DIAGNOSIS — R19.4 CHANGE IN BOWEL HABITS: ICD-10-CM

## 2021-07-06 PROCEDURE — 43239 EGD BIOPSY SINGLE/MULTIPLE: CPT | Performed by: INTERNAL MEDICINE

## 2021-07-06 PROCEDURE — 88305 TISSUE EXAM BY PATHOLOGIST: CPT | Performed by: PATHOLOGY

## 2021-07-06 PROCEDURE — 45380 COLONOSCOPY AND BIOPSY: CPT | Performed by: INTERNAL MEDICINE

## 2021-07-06 PROCEDURE — 88313 SPECIAL STAINS GROUP 2: CPT | Performed by: PATHOLOGY

## 2021-07-06 RX ORDER — PROPOFOL 10 MG/ML
INJECTION, EMULSION INTRAVENOUS AS NEEDED
Status: DISCONTINUED | OUTPATIENT
Start: 2021-07-06 | End: 2021-07-06

## 2021-07-06 RX ORDER — MELATONIN
2000 DAILY
COMMUNITY

## 2021-07-06 RX ORDER — MULTIVIT WITH MINERALS/LUTEIN
1000 TABLET ORAL DAILY
COMMUNITY

## 2021-07-06 RX ORDER — SODIUM CHLORIDE, SODIUM LACTATE, POTASSIUM CHLORIDE, CALCIUM CHLORIDE 600; 310; 30; 20 MG/100ML; MG/100ML; MG/100ML; MG/100ML
50 INJECTION, SOLUTION INTRAVENOUS CONTINUOUS
Status: DISCONTINUED | OUTPATIENT
Start: 2021-07-06 | End: 2021-07-10 | Stop reason: HOSPADM

## 2021-07-06 RX ORDER — DIPHENOXYLATE HYDROCHLORIDE AND ATROPINE SULFATE 2.5; .025 MG/1; MG/1
1 TABLET ORAL DAILY
COMMUNITY

## 2021-07-06 RX ORDER — LIDOCAINE HYDROCHLORIDE 10 MG/ML
INJECTION, SOLUTION EPIDURAL; INFILTRATION; INTRACAUDAL; PERINEURAL AS NEEDED
Status: DISCONTINUED | OUTPATIENT
Start: 2021-07-06 | End: 2021-07-06

## 2021-07-06 RX ORDER — GLYCOPYRROLATE 0.2 MG/ML
INJECTION INTRAMUSCULAR; INTRAVENOUS AS NEEDED
Status: DISCONTINUED | OUTPATIENT
Start: 2021-07-06 | End: 2021-07-06

## 2021-07-06 RX ADMIN — LIDOCAINE HYDROCHLORIDE 70 MG: 10 INJECTION, SOLUTION EPIDURAL; INFILTRATION; INTRACAUDAL; PERINEURAL at 14:16

## 2021-07-06 RX ADMIN — PROPOFOL 40 MG: 10 INJECTION, EMULSION INTRAVENOUS at 14:31

## 2021-07-06 RX ADMIN — PROPOFOL 20 MG: 10 INJECTION, EMULSION INTRAVENOUS at 14:25

## 2021-07-06 RX ADMIN — PROPOFOL 20 MG: 10 INJECTION, EMULSION INTRAVENOUS at 14:37

## 2021-07-06 RX ADMIN — PROPOFOL 150 MG: 10 INJECTION, EMULSION INTRAVENOUS at 14:16

## 2021-07-06 RX ADMIN — PROPOFOL 20 MG: 10 INJECTION, EMULSION INTRAVENOUS at 14:28

## 2021-07-06 RX ADMIN — PROPOFOL 50 MG: 10 INJECTION, EMULSION INTRAVENOUS at 14:20

## 2021-07-06 RX ADMIN — GLYCOPYRROLATE 0.2 MG: 0.2 INJECTION INTRAMUSCULAR; INTRAVENOUS at 14:16

## 2021-07-06 RX ADMIN — PROPOFOL 20 MG: 10 INJECTION, EMULSION INTRAVENOUS at 14:35

## 2021-07-06 RX ADMIN — SODIUM CHLORIDE, SODIUM LACTATE, POTASSIUM CHLORIDE, CALCIUM CHLORIDE 50 ML/HR: 600; 310; 30; 20 INJECTION, SOLUTION INTRAVENOUS at 13:51

## 2021-07-06 NOTE — ANESTHESIA PREPROCEDURE EVALUATION
Procedure:  COLONOSCOPY  EGD    Relevant Problems   CARDIO   (+) Hemorrhoids      GI/HEPATIC   (+) Fatty liver   (+) Liver cyst   (+) Rectal bleeding      NEURO/PSYCH   (+) History of colon polyps        Physical Exam    Airway    Mallampati score: II  TM Distance: >3 FB  Neck ROM: full     Dental   No notable dental hx     Cardiovascular  Cardiovascular exam normal    Pulmonary      Other Findings        Anesthesia Plan  ASA Score- 2     Anesthesia Type- IV sedation with anesthesia with ASA Monitors  Additional Monitors:   Airway Plan:     Comment: I discussed risks (reviewed with patient on the anesthesia consent form), benefits and alternatives of monitored sedation including the possibility under sedation to have recall or mild discomfort          Plan Factors-    Chart reviewed  Patient summary reviewed  Induction- intravenous  Postoperative Plan-     Informed Consent- Anesthetic plan and risks discussed with patient  I personally reviewed this patient with the CRNA  Discussed and agreed on the Anesthesia Plan with the CRNA  Tejas Robert

## 2021-07-06 NOTE — DISCHARGE INSTRUCTIONS
Upper Endoscopy and Colonoscopy   WHAT YOU NEED TO KNOW:   An upper endoscopy is also called an upper gastrointestinal (GI) endoscopy, or an esophagogastroduodenoscopy (EGD)  It is a procedure to examine the inside of your esophagus, stomach, and duodenum (first part of the small intestine) with a scope  You may feel bloated, gassy, or have some abdominal discomfort after your procedure  Your throat may be sore for 24 to 36 hours  You may burp or pass gas from air that is still inside your body  A colonoscopy is a procedure to examine the inside of your colon (intestine) with a scope  Polyps or tissue growths may have been removed during your colonoscopy  It is normal to feel bloated and to have some abdominal discomfort  You should be passing gas  If you have hemorrhoids or you had polyps removed, you may have a small amount of bleeding  DISCHARGE INSTRUCTIONS:   Seek care immediately if:   · You have sudden, severe abdominal pain  · You have problems swallowing  · You have a large amount of black, sticky bowel movements or blood in your bowel movements  · You have sudden trouble breathing  · You feel weak, lightheaded, or faint or your heart beats faster than normal for you  Contact your healthcare provider if:   · You have a fever and chills  · You have nausea or are vomiting  · Your abdomen is bloated or feels full and hard  · You have abdominal pain  · You have a large amount of black, sticky bowel movements or blood in your bowel movements  · You have not had a bowel movement for 3 days after your procedure  · You have rash or hives  · You have questions or concerns about your procedure  Activity:   ·       Do not lift, strain, or run for 24 hours after your procedure  ·       Rest after your procedure  You have been given medicine to relax you  Do not drive or make important decisions until the day after your procedure   Return to your normal activity as directed  ·       Relieve gas and discomfort from bloating by lying on your right side with a heating pad on your abdomen  You may need to take short walks to help the gas move out  Eat small meals until bloating is relieved  Follow up with your healthcare provider as directed: Write down your questions so you remember to ask them during your visits  If you take a blood thinner, please review the specific instructions from your endoscopist about when you should resume it  These can be found in the Recommendation and Your Medication list sections of this After Visit Summary  Hemorrhoids   WHAT YOU NEED TO KNOW:   What are hemorrhoids? Hemorrhoids are swollen blood vessels inside your rectum (internal hemorrhoids) or on your anus (external hemorrhoids)  Sometimes a hemorrhoid may prolapse  This means it extends out of your anus  What increases my risk for hemorrhoids? · Pregnancy or obesity    · Straining or sitting for a long time during bowel movements    · Liver disease    · Weak muscles around the anus caused by older age, rectal surgery, or anal intercourse    · A lack of physical activity    · Chronic diarrhea or constipation    · A low-fiber diet    What are the signs and symptoms of hemorrhoids? · Pain or itching around your anus or inside your rectum    · Swelling or bumps around your anus    · Bright red blood in your bowel movement, on the toilet paper, or in the toilet bowl    · Tissue bulging out of your anus (prolapsed hemorrhoids)    · Incontinence (poor control over urine or bowel movements)    How are hemorrhoids diagnosed? Your healthcare provider will ask about your symptoms, the foods you eat, and your bowel movements  He or she will examine your anus for external hemorrhoids  You may need the following:  · A digital rectal exam  is a test to check for hemorrhoids   Your healthcare provider will put a gloved finger inside your anus to feel for the hemorrhoids  · An anoscopy  is a test that uses a scope (small tube with a light and camera on the end) to look at your hemorrhoids  How are hemorrhoids treated? Treatment will depend on your symptoms  You may need any of the following:  · Medicines  can help decrease pain and swelling, and soften your bowel movement  The medicine may be a pill, pad, cream, or ointment  · Procedures  may be used to shrink or remove your hemorrhoid  Examples include rubber-band ligation, sclerotherapy, and photocoagulation  These procedures may be done in your healthcare provider's office  Ask your healthcare provider for more information about these procedures  · Surgery  may be needed to shrink or remove your hemorrhoids  How can I manage my symptoms? · Apply ice on your anus for 15 to 20 minutes every hour or as directed  Use an ice pack, or put crushed ice in a plastic bag  Cover it with a towel before you apply it to your anus  Ice helps prevent tissue damage and decreases swelling and pain  · Take a sitz bath  Fill a bathtub with 4 to 6 inches of warm water  You may also use a sitz bath pan that fits inside a toilet bowl  Sit in the sitz bath for 15 minutes  Do this 3 times a day, and after each bowel movement  The warm water can help decrease pain and swelling  · Keep your anal area clean  Gently wash the area with warm water daily  Soap may irritate the area  After a bowel movement, wipe with moist towelettes or wet toilet paper  Dry toilet paper can irritate the area  How can I help prevent hemorrhoids? · Do not strain to have a bowel movement  Do not sit on the toilet too long  These actions can increase pressure on the tissues in your rectum and anus  · Drink plenty of liquids  Liquids can help prevent constipation  Ask how much liquid to drink each day and which liquids are best for you  · Eat a variety of high-fiber foods    Examples include fruits, vegetables, and whole grains  Ask your healthcare provider how much fiber you need each day  You may need to take a fiber supplement  · Exercise as directed  Exercise, such as walking, may make it easier to have a bowel movement  Ask your healthcare provider to help you create an exercise plan  · Do not have anal sex  Anal sex can weaken the skin around your rectum and anus  · Avoid heavy lifting  This can cause straining and increase your risk for another hemorrhoid  When should I seek immediate care? · You have severe pain in your rectum or around your anus  · You have severe pain in your abdomen and you are vomiting  · You have bleeding from your anus that soaks through your underwear  When should I contact my healthcare provider? · You have frequent and painful bowel movements  · Your hemorrhoid looks or feels more swollen than usual      · You do not have a bowel movement for 2 days or more  · You see or feel tissue coming through your anus  · You have questions or concerns about your condition or care  CARE AGREEMENT:   You have the right to help plan your care  Learn about your health condition and how it may be treated  Discuss treatment options with your healthcare providers to decide what care you want to receive  You always have the right to refuse treatment  The above information is an  only  It is not intended as medical advice for individual conditions or treatments  Talk to your doctor, nurse or pharmacist before following any medical regimen to see if it is safe and effective for you  © Copyright [address] Information is for End User's use only and may not be sold, redistributed or otherwise used for commercial purposes  All illustrations and images included in CareNotes® are the copyrighted property of A D A M , Inc  or [facility] [employer] M Tripp  Diverticulosis   WHAT YOU NEED TO KNOW:   What is diverticulosis?   Diverticulosis is a condition that causes small pockets called diverticula to form in your intestine  These pockets make it difficult for bowel movements to pass through your digestive system  What causes diverticulosis? Diverticula form when muscles have to work hard to move bowel movements through the intestine  The force causes bulges to form at weak areas in the intestine  This may happen if you eat foods that are low in fiber  Fiber helps give your bowel movements more bulk so they are larger and easier to move through your colon  The following may increase your risk of diverticulosis:  · A history of constipation    · Age 36 or older    · Obesity    · Lack of exercise    What are the signs and symptoms of diverticulosis? Diverticulosis usually does not cause any signs or symptoms  It may cause any of the following in some people:  · Pain or discomfort in your lower abdomen    · Abdominal bloating    · Constipation or diarrhea    How is diverticulosis diagnosed? Your healthcare provider will examine you and ask about your bowel movements, diet, and symptoms  He or she will also ask about any medical conditions you have or medicines you take  You may need any of the following:  · Blood tests  may be done to check for signs of inflammation  · A barium enema  is an x-ray of your colon that may show diverticula  A tube is put into your anus, and a liquid called barium is put through the tube  Barium is used so that healthcare providers can see your colon more clearly  · Flexible sigmoidoscopy  is a test to look for any changes in your lower intestines and rectum  It may also show the cause of any bleeding or pain  A soft, bendable tube with a light on the end will be put into your anus  It will then be moved forward into your intestine  · A colonoscopy  is used to look at your whole colon  A scope (long bendable tube with a light on the end) is used to take pictures  This test may show diverticula       · A CT scan , or CAT scan, may show diverticula  You may be given contrast liquid before the scan  Tell the healthcare provider if you have ever had an allergic reaction to contrast liquid  How is diverticulosis managed? The goal of treatment is to manage any symptoms you have and prevent other problems such as diverticulitis  Diverticulitis is swelling or infection of the diverticula  Your healthcare provider may recommend any of the following:  · Eat a variety of high-fiber foods  High-fiber foods help you have regular bowel movements  High-fiber foods include cooked beans, fruits, vegetables, and some cereals  Most adults need 25 to 35 grams of fiber each day  Your healthcare provider may recommend that you have more  Ask your healthcare provider how much fiber you need  Increase fiber slowly  You may have abdominal discomfort, bloating, and gas if you add fiber to your diet too quickly  You may need to take a fiber supplement if you are not getting enough fiber from food  · Medicines  to soften your bowel movements may be given  You may also need medicines to treat symptoms such as bloating and pain  · Drink liquids as directed  You may need to drink 2 to 3 liters (8 to 12 cups) of liquids every day  Ask your healthcare provider how much liquid to drink each day and which liquids are best for you  · Apply heat  on your abdomen for 20 to 30 minutes every 2 hours for as many days as directed  Heat helps decrease pain and muscle spasms  How can I help prevent diverticulitis or other symptoms? The following may help decrease your risk for diverticulitis or symptoms, such as bleeding  Talk to your provider about these or other things you can do to prevent problems that may occur with diverticulosis  · Exercise regularly  Ask your healthcare provider about the best exercise plan for you  Exercise can help you have regular bowel movements  Get 30 minutes of exercise on most days of the week  · Maintain a healthy weight    Ask your healthcare provider how much you should weigh  Ask him or her to help you create a weight loss plan if you are overweight  · Do not smoke  Nicotine and other chemicals in cigarettes increase your risk for diverticulitis  Ask your healthcare provider for information if you currently smoke and need help to quit  E-cigarettes or smokeless tobacco still contain nicotine  Talk to your healthcare provider before you use these products  · Ask your healthcare provider if it is safe to take NSAIDs  NSAIDs may increase your risk of diverticulitis  When should I seek immediate care? · You have severe pain on the left side of your lower abdomen  · Your bowel movements are bright or dark red  When should I contact my healthcare provider? · You have a fever and chills  · You feel dizzy or lightheaded  · You have nausea, or you are vomiting  · You have a change in your bowel movements  · You have questions or concerns about your condition or care  CARE AGREEMENT:   You have the right to help plan your care  Learn about your health condition and how it may be treated  Discuss treatment options with your healthcare providers to decide what care you want to receive  You always have the right to refuse treatment  The above information is an  only  It is not intended as medical advice for individual conditions or treatments  Talk to your doctor, nurse or pharmacist before following any medical regimen to see if it is safe and effective for you  © Copyright [address] Information is for End User's use only and may not be sold, redistributed or otherwise used for commercial purposes  All illustrations and images included in CareNotes® are the copyrighted property of A D A M , Inc  or [employer] [facility]  Gastric Polyps   WHAT YOU NEED TO KNOW:   What are gastric polyps? Gastric polyps are growths that form in the lining of your stomach   They are not cancerous, but certain types of polyps can change into cancer  What puts me at risk for gastric polyps? · Chronic gastritis caused by NSAIDs use or ulcers    · Long-term use of proton pump inhibitor medicines (used to decrease stomach acid)    · An infection in your stomach caused by H  pylori bacteria    What are the symptoms of gastric polyps? You may have no symptoms  Large polyps may cause any of the following:  · Abdominal pain    · Indigestion    · Vomiting after meals or vomiting blood    · Dark or bloody bowel movements    How are gastric polyps diagnosed? Gastric polyps are usually found during an endoscopy for another reason  All or part of the polyp will be removed during the test  Your healthcare provider may also remove tissue from your stomach  The polyps and tissue are sent to the lab for testing  How are gastric polyps treated? Some types of polyps go away on their own  Other types may be removed if they are large, you have symptoms, or abnormal cells are found  Large polyps and abnormal cells increase your risk for cancer  You may also need antibiotics if you have an infection caused by H  pylori bacteria  Part of your stomach may be removed if the polyps cannot be removed and abnormal cells are found  When should I seek immediate care? · You have blood in your vomit  · You have dark or bloody bowel movements  · You have severe pain in your abdomen that does not go away after you take medicine  When should I contact my healthcare provider? · You have indigestion that does not go away with treatment  · You vomit after meals  · You have questions or concerns about your condition or care  CARE AGREEMENT:   You have the right to help plan your care  Learn about your health condition and how it may be treated  Discuss treatment options with your healthcare providers to decide what care you want to receive  You always have the right to refuse treatment   The above information is an  only  It is not intended as medical advice for individual conditions or treatments  Talk to your doctor, nurse or pharmacist before following any medical regimen to see if it is safe and effective for you  © Copyright [address] Information is for End User's use only and may not be sold, redistributed or otherwise used for commercial purposes  All illustrations and images included in CareNotes® are the copyrighted property of [employer]  or [employer]on Tripp  Gastritis   WHAT YOU NEED TO KNOW:   What is gastritis? Gastritis is inflammation or irritation of the lining of your stomach  What increases my risk for gastritis? · Infection with bacteria, a virus, or a parasite    · NSAIDs, aspirin, or steroid medicine    · Use of tobacco products or alcohol    · Trauma such as an injury to your stomach or intestine    · Autoimmune disorders such as diabetes, thyroid disease, or Crohn disease    · Stress    · Age older than 60 years    · Illegal drugs, such as cocaine    What are the signs and symptoms of gastritis? · Stomach pain, burning, or tenderness when you press on it    · Stomach fullness or tightness    · Nausea or vomiting    · Loss of appetite, or feeling full quickly when you eat    · Bad breath    · Fatigue or feeling more tired than usual    · Heartburn    How is gastritis diagnosed? Your healthcare provider will ask about your signs and symptoms and examine you  You may need any of the following:  · Blood tests  may be used to show an infection, dehydration, or anemia (low red blood cell levels)  · A bowel movement sample  may be tested for blood or the germ that may be causing your gastritis  · A breath test  may show if H pylori is causing your gastritis  You will be given a liquid to drink  Then you will breathe into a bag  Your healthcare provider will measure the amount of carbon dioxide in your breath   Extra amounts of carbon dioxide may mean you have an H pylori infection  · An endoscopy  may be used to look for irritation or bleeding in your stomach  Your healthcare provider will use an endoscope (tube with a light and camera on the end) during the procedure  He or she may take a sample from your stomach to be tested  How is gastritis treated? Your symptoms may go away without treatment  Treatment will depend on what is causing your gastritis  Your healthcare provider may recommend changes to the medicines you take  Medicines may be given to help treat a bacterial infection or decrease stomach acid  How can I manage or prevent gastritis? · Do not smoke  Nicotine and other chemicals in cigarettes and cigars can make your symptoms worse and cause lung damage  Ask your healthcare provider for information if you currently smoke and need help to quit  E-cigarettes or smokeless tobacco still contain nicotine  Talk to your healthcare provider before you use these products  · Do not drink alcohol  Alcohol can prevent healing and make your gastritis worse  Talk to your healthcare provider if you need help to stop drinking  · Do not take NSAIDs or aspirin unless directed  These and similar medicines can cause irritation of your stomach lining  If your healthcare provider says it is okay to take NSAIDs, take them with food  · Do not eat foods that cause irritation  Foods such as oranges and salsa can cause burning or pain  Eat a variety of healthy foods  Examples include fruits (not citrus), vegetables, low-fat dairy products, beans, whole-grain breads, and lean meats and fish  Try to eat small meals, and drink water with your meals  Do not eat for at least 3 hours before you go to bed  · Find ways to relax and decrease stress  Stress can increase stomach acid and make gastritis worse  Activities such as yoga, meditation, or listening to music can help you relax  Spend time with friends, or do things you enjoy      Call 911 for any of the following:   · You develop chest pain or shortness of breath  When should I seek immediate care? · You vomit blood  · You have black or bloody bowel movements  · You have severe stomach or back pain  When should I contact my healthcare provider? · You have a fever  · You have new or worsening symptoms, even after treatment  · You have questions or concerns about your condition or care  CARE AGREEMENT:   You have the right to help plan your care  Learn about your health condition and how it may be treated  Discuss treatment options with your healthcare providers to decide what care you want to receive  You always have the right to refuse treatment  The above information is an  only  It is not intended as medical advice for individual conditions or treatments  Talk to your doctor, nurse or pharmacist before following any medical regimen to see if it is safe and effective for you  © Copyright [address] Information is for End User's use only and may not be sold, redistributed or otherwise used for commercial purposes   All illustrations and images included in CareNotes® are the copyrighted property of A D A M , Inc  or [address] [place] [address]

## 2021-07-06 NOTE — ANESTHESIA POSTPROCEDURE EVALUATION
Post-Op Assessment Note    CV Status:  Stable  Pain Score: 0    Pain management: adequate     Mental Status:  Alert and awake   Hydration Status:  Euvolemic   PONV Controlled:  Controlled   Airway Patency:  Patent      Post Op Vitals Reviewed: Yes      Staff: CRNA   Comments: Pt awake, alert, able to maintain own airway, VSS, report to recovery RN        No complications documented      BP      Temp      Pulse     Resp      SpO2   96% RA

## 2021-07-06 NOTE — INTERVAL H&P NOTE
H&P reviewed  After examining the patient I find no changes in the patients condition since the H&P had been written  EGD and colonoscopy for heartburn, change in bowel habits, rectal bleeding, history of colon polyps        Vitals:    07/06/21 1345   BP: 118/69   Pulse: 72   Resp: 15   Temp: 98 4 °F (36 9 °C)   SpO2: 96%

## 2021-07-14 NOTE — RESULT ENCOUNTER NOTE
EGD RECALL 3 years for Barretts  COLON RECALL 5 years for personal history of colon polyps  Called pt,  answered, left message w  that results will be sent to [address]

## 2021-07-26 DIAGNOSIS — K22.70 BARRETT'S ESOPHAGUS WITHOUT DYSPLASIA: Primary | ICD-10-CM

## 2021-07-26 RX ORDER — OMEPRAZOLE 20 MG/1
20 CAPSULE, DELAYED RELEASE ORAL DAILY
Qty: 90 CAPSULE | Refills: 1 | Status: SHIPPED | OUTPATIENT
Start: 2021-07-26 | End: 2021-12-06 | Stop reason: SDUPTHER

## 2021-07-26 NOTE — TELEPHONE ENCOUNTER
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

## 2021-09-02 LAB
A2 MACROGLOB SERPL-MCNC: 152 MG/DL (ref 110–276)
ALBUMIN SERPL-MCNC: 4.5 G/DL (ref 3.8–4.8)
ALBUMIN/GLOB SERPL: 2 {RATIO} (ref 1.2–2.2)
ALP SERPL-CCNC: 67 IU/L (ref 48–121)
ALT SERPL W P-5'-P-CCNC: 17 IU/L (ref 0–40)
ALT SERPL-CCNC: 14 IU/L (ref 0–32)
APO A-I SERPL-MCNC: 176 MG/DL (ref 116–209)
AST SERPL-CCNC: 26 IU/L (ref 0–40)
BILIRUB SERPL-MCNC: 0.4 MG/DL (ref 0–1.2)
BILIRUB SERPL-MCNC: 0.4 MG/DL (ref 0–1.2)
BUN SERPL-MCNC: 13 MG/DL (ref 8–27)
BUN/CREAT SERPL: 19 (ref 12–28)
CALCIUM SERPL-MCNC: 9.2 MG/DL (ref 8.7–10.3)
CHLORIDE SERPL-SCNC: 104 MMOL/L (ref 96–106)
CO2 SERPL-SCNC: 20 MMOL/L (ref 20–29)
COMMENT: NORMAL
CREAT SERPL-MCNC: 0.67 MG/DL (ref 0.57–1)
FIBROSIS SCORING:: NORMAL
FIBROSIS STAGE SERPL QL: NORMAL
GGT SERPL-CCNC: 10 IU/L (ref 0–60)
GLOBULIN SER-MCNC: 2.2 G/DL (ref 1.5–4.5)
GLUCOSE SERPL-MCNC: 98 MG/DL (ref 65–99)
HAPTOGLOB SERPL-MCNC: 167 MG/DL (ref 37–355)
HCV AB S/CO SERPL IA: <0.1 S/CO RATIO (ref 0–0.9)
INTERPRETATIONS: NORMAL
LIVER FIBR SCORE SERPL CALC.FIBROSURE: 0.06 (ref 0–0.21)
NECROINFLAMM ACTIVITY SCORING:: NORMAL
NECROINFLAMMATORY ACT GRADE SERPL QL: NORMAL
NECROINFLAMMATORY ACT SCORE SERPL: 0.04 (ref 0–0.17)
POTASSIUM SERPL-SCNC: 4.2 MMOL/L (ref 3.5–5.2)
PROT SERPL-MCNC: 6.7 G/DL (ref 6–8.5)
SERVICE CMNT-IMP: NORMAL
SL AMB EGFR AFRICAN AMERICAN: 104 ML/MIN/1.73
SL AMB EGFR NON AFRICAN AMERICAN: 91 ML/MIN/1.73
SODIUM SERPL-SCNC: 141 MMOL/L (ref 134–144)

## 2021-11-17 ENCOUNTER — TELEPHONE (OUTPATIENT)
Dept: OBGYN CLINIC | Facility: CLINIC | Age: 66
End: 2021-11-17

## 2021-11-17 DIAGNOSIS — Z12.31 ENCOUNTER FOR SCREENING MAMMOGRAM FOR BREAST CANCER: Primary | ICD-10-CM

## 2021-12-01 ENCOUNTER — TELEPHONE (OUTPATIENT)
Dept: GASTROENTEROLOGY | Facility: CLINIC | Age: 66
End: 2021-12-01

## 2021-12-02 PROBLEM — Z87.42 HISTORY OF ABNORMAL CERVICAL PAP SMEAR: Status: ACTIVE | Noted: 2021-12-02

## 2021-12-02 PROBLEM — Z85.3 PERSONAL HISTORY OF BREAST CANCER: Status: ACTIVE | Noted: 2021-12-02

## 2021-12-03 ENCOUNTER — VBI (OUTPATIENT)
Dept: ADMINISTRATIVE | Facility: OTHER | Age: 66
End: 2021-12-03

## 2021-12-03 ENCOUNTER — OFFICE VISIT (OUTPATIENT)
Dept: OBGYN CLINIC | Facility: CLINIC | Age: 66
End: 2021-12-03
Payer: MEDICARE

## 2021-12-03 VITALS
WEIGHT: 166.8 LBS | SYSTOLIC BLOOD PRESSURE: 110 MMHG | DIASTOLIC BLOOD PRESSURE: 60 MMHG | HEIGHT: 65 IN | BODY MASS INDEX: 27.79 KG/M2

## 2021-12-03 DIAGNOSIS — N64.4 BREAST PAIN, LEFT: Primary | ICD-10-CM

## 2021-12-03 PROCEDURE — 1124F ACP DISCUSS-NO DSCNMKR DOCD: CPT | Performed by: OBSTETRICS & GYNECOLOGY

## 2021-12-03 PROCEDURE — 99213 OFFICE O/P EST LOW 20 MIN: CPT | Performed by: OBSTETRICS & GYNECOLOGY

## 2021-12-03 RX ORDER — OMEGA-3/DHA/EPA/FISH OIL 300-1000MG
1 CAPSULE ORAL DAILY
COMMUNITY

## 2021-12-03 RX ORDER — ASPIRIN 81 MG
1 TABLET, DELAYED RELEASE (ENTERIC COATED) ORAL EVERY 24 HOURS
COMMUNITY

## 2021-12-06 DIAGNOSIS — K22.70 BARRETT'S ESOPHAGUS WITHOUT DYSPLASIA: ICD-10-CM

## 2021-12-06 RX ORDER — OMEPRAZOLE 20 MG/1
20 CAPSULE, DELAYED RELEASE ORAL DAILY
Qty: 90 CAPSULE | Refills: 1 | Status: SHIPPED | OUTPATIENT
Start: 2021-12-06 | End: 2022-04-27 | Stop reason: SDUPTHER

## 2021-12-07 ENCOUNTER — OFFICE VISIT (OUTPATIENT)
Dept: GASTROENTEROLOGY | Facility: CLINIC | Age: 66
End: 2021-12-07
Payer: MEDICARE

## 2021-12-07 VITALS
BODY MASS INDEX: 27.99 KG/M2 | HEIGHT: 65 IN | SYSTOLIC BLOOD PRESSURE: 118 MMHG | WEIGHT: 168 LBS | HEART RATE: 69 BPM | DIASTOLIC BLOOD PRESSURE: 66 MMHG

## 2021-12-07 DIAGNOSIS — K76.0 FATTY LIVER: Primary | ICD-10-CM

## 2021-12-07 DIAGNOSIS — Z86.010 HISTORY OF COLON POLYPS: ICD-10-CM

## 2021-12-07 DIAGNOSIS — K76.89 LIVER CYST: ICD-10-CM

## 2021-12-07 DIAGNOSIS — K22.70 BARRETT'S ESOPHAGUS WITHOUT DYSPLASIA: ICD-10-CM

## 2021-12-07 PROCEDURE — 99214 OFFICE O/P EST MOD 30 MIN: CPT | Performed by: INTERNAL MEDICINE

## 2022-01-05 ENCOUNTER — TELEPHONE (OUTPATIENT)
Dept: GASTROENTEROLOGY | Facility: CLINIC | Age: 67
End: 2022-01-05

## 2022-01-05 NOTE — TELEPHONE ENCOUNTER
Pt states GS wants her to take Vitamin E/she is unsure what dose is OK; just bought 670 mg tabs but also has 15 mg in her multi vitamin/is concerned about taking too much  [phone], but if call does not go through can try [phone]; mssg OK on either #

## 2022-01-05 NOTE — TELEPHONE ENCOUNTER
Pt was prescribed Vit E 600 units daily 6/16/21  At her 12/7 ov it notes to continue Vit E  Is the 670 mg tablet along with the 15 mg in her multi vit ok to proceed with?

## 2022-03-03 ENCOUNTER — OFFICE VISIT (OUTPATIENT)
Dept: OBGYN CLINIC | Facility: CLINIC | Age: 67
End: 2022-03-03
Payer: MEDICARE

## 2022-03-03 VITALS — WEIGHT: 167 LBS | DIASTOLIC BLOOD PRESSURE: 76 MMHG | BODY MASS INDEX: 28.22 KG/M2 | SYSTOLIC BLOOD PRESSURE: 124 MMHG

## 2022-03-03 DIAGNOSIS — Z85.3 PERSONAL HISTORY OF BREAST CANCER: Primary | ICD-10-CM

## 2022-03-03 PROBLEM — N64.4 BREAST PAIN, LEFT: Status: RESOLVED | Noted: 2021-12-03 | Resolved: 2022-03-03

## 2022-03-03 PROCEDURE — 99213 OFFICE O/P EST LOW 20 MIN: CPT | Performed by: OBSTETRICS & GYNECOLOGY

## 2022-03-03 NOTE — PATIENT INSTRUCTIONS
Return to office in one year unless having any problems such as breast changes, bleeding, new persistent pain, new progressive bloating, new problems eating (getting full to quickly) or new constant urinary pressure that does not resolve in one week  If you do not hear from us call in July to schedule your January appointment

## 2022-03-03 NOTE — ASSESSMENT & PLAN NOTE
Breast pain resolved, no complaints  Normal imaging from 1/2022 reviewed  Normal exam    Will RTO for Medicare well check in 1/2023, pap at that time     Agrees to plan

## 2022-03-03 NOTE — LETTER
March 3, 2022     [address]    Patient: Donna Pardo   YOB: 1955   Date of Visit: 3/3/2022       Dear Dr Edie Black: Thank you for referring Yolanda Bar to me for evaluation  Below are my notes for this consultation  If you have questions, please do not hesitate to call me  I look forward to following your patient along with you  Sincerely,        Lisa Wyman MD        CC: No Recipients  Lisa Wyman MD  3/3/2022  1:07 PM  Sign when Signing Visit  Assessment/Plan:    Personal history of breast cancer - left  Breast pain resolved, no complaints  Normal imaging from 1/2022 reviewed  Normal exam    Will RTO for Medicare well check in 1/2023, pap at that time  Agrees to plan       Diagnoses and all orders for this visit:    Personal history of breast cancer - left          Subjective:      Patient ID: Brinda Roman is a 74 y o  female  Here for breast recheck    The following portions of the patient's history were reviewed and updated as appropriate:   She  has a past medical history of Abnormal Pap smear of cervix, Torrez's esophagus, Breast cancer ([address] ), Cancer ([address] ) (2009), Colon polyp, Fatty liver, Female infertility (Early 1980's), GERD (gastroesophageal reflux disease), Hemorrhoids, Hypothyroidism (1970's), Miscarriage, Osteopenia, Polycystic ovary syndrome (1970's), Pulmonary arterial hypertension ([address] ) (2017?), and Varicella (Childhood)  She   Patient Active Problem List    Diagnosis Date Noted    Personal history of breast cancer - left 12/02/2021    History of abnormal cervical Pap smear - LEEP for LGSIL 2013 12/02/2021    Liver cyst 06/16/2021    Fatty liver 06/16/2021    History of colon polyps 10/30/2019    Hemorrhoids 10/30/2019    Rectal bleeding 10/30/2019     She  has a past surgical history that includes Colonoscopy (07/2021); Tubal ligation; Cholecystectomy;  Mohs surgery; Breast lumpectomy; Mammo (historical) (Bilateral, 01/11/2022); Breast biopsy (2009); pr conization cervix,knife/laser; DXA procedure(historical) (12/2020); Dilation and curettage of uterus; and Tonsillectomy  Her family history includes Cancer in her father, maternal aunt, and mother; Ovarian cancer in her cousin; Stomach cancer in her father; Stroke in her mother  She  reports that she has quit smoking  Her smoking use included cigarettes  She smoked 0 00 packs per day for 10 00 years  She has never used smokeless tobacco  She reports current alcohol use of about 16 0 - 25 0 standard drinks of alcohol per week  She reports current drug use  Drug: Marijuana  Current Outpatient Medications   Medication Sig Dispense Refill    Ascorbic Acid (vitamin C) 1000 MG tablet Take 1,000 mg by mouth daily      Calcium Carbonate+Vitamin D (Calcium 600 + D) 600-200 MG-UNIT TABS Take 1 tablet by mouth every 24 hours      cholecalciferol (VITAMIN D3) 1,000 units tablet Take 2,000 Units by mouth daily      fish oil-omega-3 fatty acids 1000 MG capsule Take 1 capsule by mouth daily      IODINE EX Apply topically daily      Lecithin-I-B6-Cider Vinegar 400-0 05-20-80 MG TABS Take 1 capsule by mouth every 24 hours      Multiple Vitamins-Minerals (CENTRUM SILVER 50+WOMEN PO) Take 1 tablet by mouth every 24 hours      multivitamin (THERAGRAN) TABS Take 1 tablet by mouth daily        omeprazole (PriLOSEC) 20 mg delayed release capsule Take 1 capsule (20 mg total) by mouth daily 90 capsule 1    polyethylene glycol (MIRALAX) 17 g packet Take 17 g by mouth daily as needed       Red Yeast Rice Extract (RED YEAST RICE PO) Take by mouth      vitamin E 600 UNIT capsule Take 1 capsule (600 Units total) by mouth daily 30 capsule 11    vitamin k 100 MCG tablet Take 1 tablet by mouth every 24 hours       No current facility-administered medications for this visit  She has No Known Allergies       Review of Systems  No breast masses, nipple discharge or nipple bleeding  Resolved breast pain    Objective:      /74   Wt 75 8 kg (167 lb)   Breastfeeding No   BMI 28 22 kg/m²          Physical Exam    Appears well, no apparent distress  Does not appear anxious or depressed  Neck: thyroid normal size without nodules, no palpable adenopathy  Breasts: no masses, nodes, skin changes  Left breast with basilar scar, defect and XRT changes    Abdomen: soft, non tender, non tender liver edge

## 2022-03-25 LAB
ALBUMIN SERPL-MCNC: 4.8 G/DL (ref 3.8–4.8)
ALBUMIN/GLOB SERPL: 2.4 {RATIO} (ref 1.2–2.2)
ALP SERPL-CCNC: 75 IU/L (ref 44–121)
ALT SERPL-CCNC: 21 IU/L (ref 0–32)
AST SERPL-CCNC: 30 IU/L (ref 0–40)
BILIRUB SERPL-MCNC: 0.5 MG/DL (ref 0–1.2)
BUN SERPL-MCNC: 16 MG/DL (ref 8–27)
BUN/CREAT SERPL: 21 (ref 12–28)
CALCIUM SERPL-MCNC: 9.8 MG/DL (ref 8.7–10.3)
CHLORIDE SERPL-SCNC: 101 MMOL/L (ref 96–106)
CO2 SERPL-SCNC: 24 MMOL/L (ref 20–29)
CREAT SERPL-MCNC: 0.75 MG/DL (ref 0.57–1)
EGFR: 87 ML/MIN/1.73
GLOBULIN SER-MCNC: 2 G/DL (ref 1.5–4.5)
GLUCOSE SERPL-MCNC: 96 MG/DL (ref 65–99)
POTASSIUM SERPL-SCNC: 4.6 MMOL/L (ref 3.5–5.2)
PROT SERPL-MCNC: 6.8 G/DL (ref 6–8.5)
SODIUM SERPL-SCNC: 139 MMOL/L (ref 134–144)

## 2022-04-25 ENCOUNTER — TELEPHONE (OUTPATIENT)
Dept: GASTROENTEROLOGY | Facility: CLINIC | Age: 67
End: 2022-04-25

## 2022-04-25 NOTE — TELEPHONE ENCOUNTER
Pt states for about a wk has had bad rectal bleeding; wants appt w/ GS soon  No pain; unable to make appt today w/ NP  CB# [phone]

## 2022-04-25 NOTE — PROGRESS NOTES
Assessment/Plan:    Personal history of breast cancer - left  Breast pain resolved, no complaints  Normal imaging from 1/2022 reviewed  Normal exam    Will RTO for Medicare well check in 1/2023, pap at that time  Agrees to plan       Diagnoses and all orders for this visit:    Personal history of breast cancer - left          Subjective:      Patient ID: Tera Oseguera is a 74 y o  female  Here for breast recheck    The following portions of the patient's history were reviewed and updated as appropriate:   She  has a past medical history of Abnormal Pap smear of cervix, Torrez's esophagus, Breast cancer ([address] ), Cancer ([address] ) (2009), Colon polyp, Fatty liver, Female infertility (Early 1980's), GERD (gastroesophageal reflux disease), Hemorrhoids, Hypothyroidism (1970's), Miscarriage, Osteopenia, Polycystic ovary syndrome (1970's), Pulmonary arterial hypertension ([address] ) (2017?), and Varicella (Childhood)  She   Patient Active Problem List    Diagnosis Date Noted    Personal history of breast cancer - left 12/02/2021    History of abnormal cervical Pap smear - LEEP for LGSIL 2013 12/02/2021    Liver cyst 06/16/2021    Fatty liver 06/16/2021    History of colon polyps 10/30/2019    Hemorrhoids 10/30/2019    Rectal bleeding 10/30/2019     She  has a past surgical history that includes Colonoscopy (07/2021); Tubal ligation; Cholecystectomy; Mohs surgery; Breast lumpectomy; Mammo (historical) (Bilateral, 01/11/2022); Breast biopsy (2009); pr conization cervix,knife/laser; DXA procedure(historical) (12/2020); Dilation and curettage of uterus; and Tonsillectomy  Her family history includes Cancer in her father, maternal aunt, and mother; Ovarian cancer in her cousin; Stomach cancer in her father; Stroke in her mother  She  reports that she has quit smoking  Her smoking use included cigarettes  She smoked 0 00 packs per day for 10 00 years   She has never used smokeless tobacco  She reports current alcohol use of about 16 0 - 25 0 standard drinks of alcohol per week  She reports current drug use  Drug: Marijuana  Current Outpatient Medications   Medication Sig Dispense Refill    Ascorbic Acid (vitamin C) 1000 MG tablet Take 1,000 mg by mouth daily      Calcium Carbonate+Vitamin D (Calcium 600 + D) 600-200 MG-UNIT TABS Take 1 tablet by mouth every 24 hours      cholecalciferol (VITAMIN D3) 1,000 units tablet Take 2,000 Units by mouth daily      fish oil-omega-3 fatty acids 1000 MG capsule Take 1 capsule by mouth daily      IODINE EX Apply topically daily      Lecithin-I-B6-Cider Vinegar 400-0 05-20-80 MG TABS Take 1 capsule by mouth every 24 hours      Multiple Vitamins-Minerals (CENTRUM SILVER 50+WOMEN PO) Take 1 tablet by mouth every 24 hours      multivitamin (THERAGRAN) TABS Take 1 tablet by mouth daily        omeprazole (PriLOSEC) 20 mg delayed release capsule Take 1 capsule (20 mg total) by mouth daily 90 capsule 1    polyethylene glycol (MIRALAX) 17 g packet Take 17 g by mouth daily as needed       Red Yeast Rice Extract (RED YEAST RICE PO) Take by mouth      vitamin E 600 UNIT capsule Take 1 capsule (600 Units total) by mouth daily 30 capsule 11    vitamin k 100 MCG tablet Take 1 tablet by mouth every 24 hours       No current facility-administered medications for this visit  She has No Known Allergies       Review of Systems  No breast masses, nipple discharge or nipple bleeding  Resolved breast pain    Objective:      /74   Wt 75 8 kg (167 lb)   Breastfeeding No   BMI 28 22 kg/m²          Physical Exam    Appears well, no apparent distress  Does not appear anxious or depressed  Neck: thyroid normal size without nodules, no palpable adenopathy  Breasts: no masses, nodes, skin changes  Left breast with basilar scar, defect and XRT changes    Abdomen: soft, non tender, non tender liver edge Detail Level: Zone Detail Level: Detailed

## 2022-04-27 ENCOUNTER — OFFICE VISIT (OUTPATIENT)
Dept: GASTROENTEROLOGY | Facility: CLINIC | Age: 67
End: 2022-04-27
Payer: MEDICARE

## 2022-04-27 VITALS
HEIGHT: 64 IN | WEIGHT: 169.2 LBS | BODY MASS INDEX: 28.89 KG/M2 | SYSTOLIC BLOOD PRESSURE: 126 MMHG | HEART RATE: 75 BPM | DIASTOLIC BLOOD PRESSURE: 78 MMHG

## 2022-04-27 DIAGNOSIS — K64.8 OTHER HEMORRHOIDS: ICD-10-CM

## 2022-04-27 DIAGNOSIS — K22.70 BARRETT'S ESOPHAGUS WITHOUT DYSPLASIA: ICD-10-CM

## 2022-04-27 DIAGNOSIS — K62.5 RECTAL BLEEDING: Primary | ICD-10-CM

## 2022-04-27 DIAGNOSIS — K76.0 FATTY LIVER: ICD-10-CM

## 2022-04-27 DIAGNOSIS — Z86.010 HISTORY OF COLON POLYPS: ICD-10-CM

## 2022-04-27 PROCEDURE — 99214 OFFICE O/P EST MOD 30 MIN: CPT | Performed by: INTERNAL MEDICINE

## 2022-04-27 PROCEDURE — 46221 LIGATION OF HEMORRHOID(S): CPT | Performed by: INTERNAL MEDICINE

## 2022-04-27 RX ORDER — OMEPRAZOLE 20 MG/1
20 CAPSULE, DELAYED RELEASE ORAL DAILY
Qty: 90 CAPSULE | Refills: 3 | Status: SHIPPED | OUTPATIENT
Start: 2022-04-27

## 2022-04-27 NOTE — PROGRESS NOTES
[facility] [place] - Outpatient Follow-up Note  Garrison Pardo 74 y o  female MRN: [id number]  Encounter: [id number]    ASSESSMENT AND PLAN:      1  Rectal bleeding  Having issues with rectal bleeding again  Bike season is coming up so she would like her hemorrhoids banded again  Last banded in May of 2020     2  Other hemorrhoids  Right anterior hemorrhoids banded today  See procedure note  3  Fatty liver  F 0, LFTs are normal   Encourage patient to continue weight loss, limit alcohol intake, healthy lifestyle and eating  Recent CMP March of 2022 normal     - continue vitamin-E  - follow-up with PCP annually for LFT monitoring    4  Torrez's esophagus without dysplasia  EGD negative for dysplasia  Recall July 2024     - GERD lifestyle modifications  - continue omeprazole (PriLOSEC) 20 mg delayed release capsule; Take 1 capsule (20 mg total) by mouth daily  Dispense: 90 capsule; Refill: 3    5  History of colon polyps  Recall colonoscopy July 2026      Followup Appointment:  2 weeks for 2nd banding  ______________________________________________________________________    Chief Complaint   Patient presents with    Hemorrhoids     significant amount of rectal bleeding off and on     HPI:  76-year-old female here today for rectal bleeding  Has been going on for the past few weeks on and off  Has had history of hemorrhoids banded about 2 years ago  Was having no issues until recently  Denies any straining or pushing  Denies constipation  No nausea vomiting or abdominal pains  Weight is stable      Historical Information   Past Medical History:   Diagnosis Date    Abnormal Pap smear of cervix     Torrez's esophagus     [facility] )     Cancer ([facility] ) 2009    left breast    Colon polyp     Fatty liver     Female infertility Early 1980's    GERD (gastroesophageal reflux disease)     Hemorrhoids     Hypothyroidism 1970's    Miscarriage     2    Osteopenia     Polycystic ovary syndrome 1970's    Pulmonary arterial hypertension ([facility] ) 2017? Exercise induced    Varicella Childhood     Past Surgical History:   Procedure Laterality Date    BREAST BIOPSY  2009    BREAST LUMPECTOMY      CHOLECYSTECTOMY      COLONOSCOPY  07/2021    [facility]    DILATION AND CURETTAGE OF UTERUS      X2    DXA PROCEDURE (HISTORICAL)  12/2020    Osteopenia    MAMMO (HISTORICAL) Bilateral 01/11/2022    [facility]   Benign findings scattered areas fibroglandular density; 255 to 50% glandular dense breast tissue    MOHS SURGERY      MS CONIZATION CERVIX,KNIFE/LASER      TONSILLECTOMY      TUBAL LIGATION       Social History     Substance and Sexual Activity   Alcohol Use Yes    Alcohol/week: 16 0 - 25 0 standard drinks    Types: 14 - 21 Glasses of wine, 2 - 4 Cans of beer per week     Social History     Substance and Sexual Activity   Drug Use Yes    Types: Marijuana    Comment: occasional edibles     Social History     Tobacco Use   Smoking Status Former Smoker    Packs/day: 0 00    Years: 10 00    Pack years: 0 00    Types: Cigarettes   Smokeless Tobacco Never Used     Family History   Problem Relation Age of Onset    Stroke Mother     Cancer Mother         skin    Cancer Father         liver, stomach, prostate, skin    Stomach cancer Father     Cancer Maternal Aunt         unknown female cancer    Ovarian cancer Cousin     Colon cancer Neg Hx     Colon polyps Neg Hx     Breast cancer Neg Hx     Uterine cancer Neg Hx          Current Outpatient Medications:     Ascorbic Acid (vitamin C) 1000 MG tablet    Calcium Carbonate+Vitamin D (Calcium 600 + D) 600-200 MG-UNIT TABS    cholecalciferol (VITAMIN D3) 1,000 units tablet    fish oil-omega-3 fatty acids 1000 MG capsule    IODINE EX    Multiple Vitamins-Minerals (CENTRUM SILVER 50+WOMEN PO)    multivitamin (THERAGRAN) TABS    omeprazole (PriLOSEC) 20 mg delayed release capsule    polyethylene glycol (MIRALAX) 17 g packet    Red Yeast Rice Extract (RED YEAST RICE PO)    vitamin E 600 UNIT capsule    vitamin k 100 MCG tablet  No Known Allergies  Reviewed medications and allergies and updated as indicated    PHYSICAL EXAM:    Blood pressure 126/78, pulse 75, height 5' 4" (1 626 m), weight 76 7 kg (169 lb 3 2 oz), not currently breastfeeding  Body mass index is 29 04 kg/m²  General Appearance: NAD, cooperative, alert  Eyes: Anicteric, PERRLA, EOMI  ENT:  Normocephalic, atraumatic, normal mucosa  Neck:  Supple, symmetrical, trachea midline  Resp:  Clear to auscultation bilaterally; no rales, rhonchi or wheezing; respirations unlabored   CV:  S1 S2, Regular rate and rhythm; no murmur, rub, or gallop  GI:  Soft, non-tender, non-distended; normal bowel sounds; no masses, no organomegaly   Rectal:  Grade 2 hemorrhoids, normal rectal tone  Musculoskeletal: No cyanosis, clubbing or edema  Normal ROM  Skin:  No jaundice, rashes, or lesions   Heme/Lymph: No palpable cervical lymphadenopathy  Psych: Normal affect, good eye contact  Neuro: No gross deficits, AAOx3    Lab Results:   No results found for: WBC, HGB, HCT, MCV, PLT  Lab Results   Component Value Date    K 4 6 03/24/2022     03/24/2022    CO2 24 03/24/2022    BUN 16 03/24/2022    CREATININE 0 75 03/24/2022    AST 30 03/24/2022    ALT 21 03/24/2022    EGFR 87 03/24/2022     No results found for: IRON, TIBC, FERRITIN  No results found for: LIPASE            --------      [facility] - [address] [place] [address] 74 y o  female MRN: [id number]  Encounter: [id number]    ASSESSMENT AND PLAN:    The right anterior hemorrhoid area was banded today  The patient was instructed to avoid constipation and straining, and educated in appropriate fiber intake  HPI: David Burris is a 74y o  year old female who presents with rectal bleeding due to hemorrhoids       Previous treatments include: All 3 hemorrhoids were banded  Bands were previously placed: All 3 in 2020   Current Fiber intake:  Dietary  Complications of prior therapy include:  None    The patient provided consent for banding  and was placed in the left lateral position  Rectal exam showed grade 2 hemorrhoids  The O'Ivan ligator was advanced and a band was applied without difficulty   Repeat rectal exam confirmed appropriate placement  The patient was discharged home after observation in the waiting area

## 2022-04-27 NOTE — PATIENT INSTRUCTIONS
Hemorrhoids   WHAT YOU NEED TO KNOW:   Hemorrhoids are swollen blood vessels inside your rectum (internal hemorrhoids) or on your anus (external hemorrhoids)  Sometimes a hemorrhoid may prolapse  This means it extends out of your anus  DISCHARGE INSTRUCTIONS:   Return to the emergency department if:   · You have severe pain in your rectum or around your anus  · You have severe pain in your abdomen and you are vomiting  · You have bleeding from your anus that soaks through your underwear  Contact your healthcare provider if:   · You have frequent and painful bowel movements  · Your hemorrhoid looks or feels more swollen than usual      · You do not have a bowel movement for 2 days or more  · You see or feel tissue coming through your anus  · You have questions or concerns about your condition or care  Medicines: You may  need any of the following:  · A pad, cream, or ointment  can help decrease pain, swelling, and itching  · Stool softeners  help treat or prevent constipation  · NSAIDs , such as ibuprofen, help decrease swelling, pain, and fever  NSAIDs can cause stomach bleeding or kidney problems in certain people  If you take blood thinner medicine, always ask your healthcare provider if NSAIDs are safe for you  Always read the medicine label and follow directions  · Take your medicine as directed  Contact your healthcare provider if you think your medicine is not helping or if you have side effects  Tell him or her if you are allergic to any medicine  Keep a list of the medicines, vitamins, and herbs you take  Include the amounts, and when and why you take them  Bring the list or the pill bottles to follow-up visits  Carry your medicine list with you in case of an emergency  Manage your symptoms:   · Apply ice on your anus for 15 to 20 minutes every hour or as directed  Use an ice pack, or put crushed ice in a plastic bag   Cover it with a towel before you apply it to your anus  Ice helps prevent tissue damage and decreases swelling and pain  · Take a sitz bath  Fill a bathtub with 4 to 6 inches of warm water  You may also use a sitz bath pan that fits inside a toilet bowl  Sit in the sitz bath for 15 minutes  Do this 3 times a day, and after each bowel movement  The warm water can help decrease pain and swelling  · Keep your anal area clean  Gently wash the area with warm water daily  Soap may irritate the area  After a bowel movement, wipe with moist towelettes or wet toilet paper  Dry toilet paper can irritate the area  Prevent hemorrhoids:   · Do not strain to have a bowel movement  Do not sit on the toilet too long  These actions can increase pressure on the tissues in your rectum and anus  · Drink plenty of liquids  Liquids can help prevent constipation  Ask how much liquid to drink each day and which liquids are best for you  · Eat a variety of high-fiber foods  Examples include fruits, vegetables, and whole grains  Ask your healthcare provider how much fiber you need each day  You may need to take a fiber supplement  · Exercise as directed  Exercise, such as walking, may make it easier to have a bowel movement  Ask your healthcare provider to help you create an exercise plan  · Do not have anal sex  Anal sex can weaken the skin around your rectum and anus  · Avoid heavy lifting  This can cause straining and increase your risk for another hemorrhoid  Follow up with your doctor as directed:  Write down your questions so you remember to ask them during your visits  © Copyright [employer] 2022 Information is for End User's use only and may not be sold, redistributed or otherwise used for commercial purposes  All illustrations and images included in CareNotes® are the copyrighted property of A D A M , Inc  or [facility]   The above information is an  only   It is not intended as medical advice for individual conditions or treatments  Talk to your doctor, nurse or pharmacist before following any medical regimen to see if it is safe and effective for you

## 2022-05-19 ENCOUNTER — OFFICE VISIT (OUTPATIENT)
Dept: GASTROENTEROLOGY | Facility: CLINIC | Age: 67
End: 2022-05-19
Payer: MEDICARE

## 2022-05-19 VITALS
DIASTOLIC BLOOD PRESSURE: 84 MMHG | BODY MASS INDEX: 28.85 KG/M2 | SYSTOLIC BLOOD PRESSURE: 132 MMHG | HEIGHT: 64 IN | WEIGHT: 169 LBS

## 2022-05-19 DIAGNOSIS — K64.8 OTHER HEMORRHOIDS: ICD-10-CM

## 2022-05-19 PROCEDURE — 46221 LIGATION OF HEMORRHOID(S): CPT | Performed by: INTERNAL MEDICINE

## 2022-05-19 NOTE — PROGRESS NOTES
[email or web address] - [place] Pardo 74 y o  female MRN: [id number]  Encounter: [id number]    ASSESSMENT AND PLAN:    The left lateral hemorrhoid area was banded today  The patient was instructed to avoid constipation and straining, and educated in appropriate fiber intake  HPI: Jennifer Mascorro is a 74y o  year old female who presents with rectal bleeding due to hemorrhoids  Previous treatments include: dietary fiber, OTC, all 3 previously banded  Bands were previously placed: RA   Current Fiber intake: dietary  Complications of prior therapy include: none    The patient provided consent for banding  and was placed in the left lateral position  Rectal exam showed grade 1 hemorrhoids  The Ruben Isauro was advanced and a band was applied without difficulty   Repeat rectal exam confirmed appropriate placement  The patient was discharged home after observation in the examination room

## 2022-07-19 ENCOUNTER — TELEPHONE (OUTPATIENT)
Dept: GASTROENTEROLOGY | Facility: AMBULARY SURGERY CENTER | Age: 67
End: 2022-07-19

## 2022-07-19 NOTE — TELEPHONE ENCOUNTER
Patients GI provider:  Dr Elena ross     Number to return call: [phone]     Reason for call: Pt calling stating she got the wrong omeprazole   Patient would like a call to discuss     Scheduled procedure/appointment date if applicable: Apt/procedure no upcoming appt

## 2022-07-19 NOTE — TELEPHONE ENCOUNTER
I reviewed that omeprazole capsules are delayed release  The immediate release would contain sodium bicarbonate

## 2022-09-02 ENCOUNTER — OFFICE VISIT (OUTPATIENT)
Dept: GASTROENTEROLOGY | Facility: CLINIC | Age: 67
End: 2022-09-02
Payer: MEDICARE

## 2022-09-02 VITALS
DIASTOLIC BLOOD PRESSURE: 80 MMHG | HEART RATE: 71 BPM | WEIGHT: 170 LBS | HEIGHT: 64 IN | SYSTOLIC BLOOD PRESSURE: 118 MMHG | BODY MASS INDEX: 29.02 KG/M2

## 2022-09-02 DIAGNOSIS — K22.70 BARRETT'S ESOPHAGUS WITHOUT DYSPLASIA: ICD-10-CM

## 2022-09-02 DIAGNOSIS — Z86.010 HISTORY OF COLON POLYPS: ICD-10-CM

## 2022-09-02 DIAGNOSIS — K76.0 FATTY LIVER: Primary | ICD-10-CM

## 2022-09-02 DIAGNOSIS — K64.8 OTHER HEMORRHOIDS: ICD-10-CM

## 2022-09-02 PROCEDURE — 99214 OFFICE O/P EST MOD 30 MIN: CPT | Performed by: INTERNAL MEDICINE

## 2022-09-02 RX ORDER — OMEPRAZOLE 20 MG/1
20 CAPSULE, DELAYED RELEASE ORAL DAILY
Qty: 90 CAPSULE | Refills: 3 | Status: SHIPPED | OUTPATIENT
Start: 2022-09-02

## 2022-09-02 NOTE — PROGRESS NOTES
[id number] - Outpatient Follow-up Note  Abbie Barragan Rattie 69 y o  female MRN: [id number]  Encounter: [id number]    ASSESSMENT AND PLAN:      1  Fatty liver  F0  Normal LFTs - next due in 3/2023  Limit ETOH intake  Encourage healthy weight loss, healthy lifestyle and exercise  - Comprehensive metabolic panel  - vitamin E 600 UNIT capsule; Take 1 capsule (600 Units total) by mouth daily  Dispense: 30 capsule; Refill: 11    2  Torrez's esophagus without dysplasia  EGD recall 7/2024  - Comprehensive metabolic panel; Future  - CBC and differential; Future  - omeprazole (PriLOSEC) 20 mg delayed release capsule; Take 1 capsule (20 mg total) by mouth daily  Dispense: 90 capsule; Refill: 3    3  Other hemorrhoids  S/p banding, w no further bleeding  Continue high fiber diet and increased water intake  4  History of colon polyps  RECALL 7/2026      Followup Appointment: 1 year  ______________________________________________________________________    Chief Complaint   Patient presents with    Follow up-hemorrhoids     HPI:  67F here today for f/u  Doing well post banding  No more issues  Eating well  Weight is stable  No GIB  Historical Information   Past Medical History:   Diagnosis Date    Abnormal Pap smear of cervix     Torrez esophagus 2021    Torrez's esophagus     Breast cancer ([address]     Cancer ([address] ) 2009    left breast    Cholelithiasis     Colon polyp     Fatty liver     Female infertility Early 1980's    GERD (gastroesophageal reflux disease)     Hemorrhoids     Hypothyroidism 1970's    Miscarriage     2    Osteopenia     Polycystic ovary syndrome 1970's    Pulmonary arterial hypertension ([address] ) 2017?     Exercise induced    Varicella Childhood     Past Surgical History:   Procedure Laterality Date    BREAST BIOPSY  2009    BREAST LUMPECTOMY      CHOLECYSTECTOMY      [facility]ONOSCOPY  07/2021    [facility]    COLONOSCOPY      DILATION AND CURETTAGE OF UTERUS      X2    DXA PROCEDURE (HISTORICAL)  2020    Osteopenia    MAMMO (HISTORICAL) Bilateral 2022    [facility] BI-RADS 2   Benign findings scattered areas fibroglandular density; 255 to 50% glandular dense breast tissue    MOHS SURGERY      LA CONIZATION CERVIX,KNIFE/LASER      TONSILLECTOMY      TUBAL LIGATION      UPPER GASTROINTESTINAL ENDOSCOPY       Social History     Substance and Sexual Activity   Alcohol Use Yes    Alcohol/week: 16 0 - 25 0 standard drinks    Types: 14 - 21 Glasses of wine, 2 - 4 Cans of beer per week     Social History     Substance and Sexual Activity   Drug Use Yes    Types: Marijuana    Comment: occasional edibles     Social History     Tobacco Use   Smoking Status Former Smoker    Packs/day: 0 00    Years: 10 00    Pack years: 0 00    Types: Cigarettes    Start date: 1965   Estefany Alfaroar Quit date: 1975    Years since quittin 7   Smokeless Tobacco Never Used   Tobacco Comment    Very light smoker     Family History   Problem Relation Age of Onset    Stroke Mother     Cancer Mother         skin    Cancer Father         liver, stomach, prostate, skin    Stomach cancer Father     Cancer Maternal Aunt         poss fallopian tube    Ovarian cancer Cousin     Colon cancer Neg Hx     Colon polyps Neg Hx     Breast cancer Neg Hx     Uterine cancer Neg Hx          Current Outpatient Medications:     Ascorbic Acid (vitamin C) 1000 MG tablet    Calcium Carbonate+Vitamin D 600-200 MG-UNIT TABS    cholecalciferol (VITAMIN D3) 1,000 units tablet    fish oil-omega-3 fatty acids 1000 MG capsule    IODINE EX    Multiple Vitamins-Minerals (CENTRUM SILVER 50+WOMEN PO)    multivitamin (THERAGRAN) TABS    omeprazole (PriLOSEC) 20 mg delayed release capsule    Red Yeast Rice Extract (RED YEAST RICE PO)    vitamin E 600 UNIT capsule    vitamin k 100 MCG tablet  No Known Allergies  Reviewed medications and allergies and updated as indicated    PHYSICAL EXAM:    Blood pressure 118/80, pulse 71, height 5' 4" (1 626 m), weight 77 1 kg (170 lb), not currently breastfeeding  Body mass index is 29 18 kg/m²  General Appearance: NAD, cooperative, alert  Eyes: Anicteric, PERRLA, EOMI  ENT:  Normocephalic, atraumatic, normal mucosa  Neck:  Supple, symmetrical, trachea midline  Resp:  Clear to auscultation bilaterally; no rales, rhonchi or wheezing; respirations unlabored   CV:  S1 S2, Regular rate and rhythm; no murmur, rub, or gallop  GI:  Soft, non-tender, non-distended; normal bowel sounds; no masses, no organomegaly   Rectal: Deferred  Musculoskeletal: No cyanosis, clubbing or edema  Normal ROM  Skin:  No jaundice, rashes, or lesions   Heme/Lymph: No palpable cervical lymphadenopathy  Psych: Normal affect, good eye contact  Neuro: No gross deficits, AAOx3    Lab Results:   No results found for: WBC, HGB, HCT, MCV, PLT  Lab Results   Component Value Date    K 4 6 03/24/2022     03/24/2022    CO2 24 03/24/2022    BUN 16 03/24/2022    CREATININE 0 75 03/24/2022    AST 30 03/24/2022    ALT 21 03/24/2022    EGFR 87 03/24/2022     No results found for: IRON, TIBC, FERRITIN  No results found for: LIPASE    Radiology Results:   No results found

## 2022-11-09 NOTE — PROGRESS NOTES
Assessment/Plan:    Midline cystocele  68 yo  who has noted intermittent bulging  No bladder or bowel dysfunction  Exam with grade III midline cystocele and grade II rectocele with valsalva  Good cervical support  Discussed options for prolapse including to conservatively follow with exams, pessary use or surgery  Would like to follow at this time  Mammo order placed  RTO 2023           Diagnoses and all orders for this visit:    Encounter for screening mammogram for malignant neoplasm of breast  -     Mammo screening bilateral w 3d & cad; Future    Personal history of breast cancer - left  -     Mammo screening bilateral w 3d & cad; Future    Midline cystocele    Other orders  -     Red Yeast Rice 600 MG CAPS          Subjective:      Patient ID: Erna Castillo is a 69 y o  female  HPI Here with prolapse complaints  The following portions of the patient's history were reviewed and updated as appropriate:   She  has a past medical history of Abnormal Pap smear of cervix, Torrez esophagus (), Torrez's esophagus, Breast cancer ([address] ), Cancer ([address] ) (), Cholelithiasis, Colon polyp, Fatty liver, Female infertility (Early 's), GERD (gastroesophageal reflux disease), Hemorrhoids, Hypothyroidism ('s), Miscarriage, Osteopenia, Polycystic ovary syndrome ('s), Pulmonary arterial hypertension ([address] ) (2017?), and Varicella (Childhood)  She   Patient Active Problem List    Diagnosis Date Noted   • Midline cystocele 11/10/2022   • Torrez's esophagus without dysplasia 2022   • Personal history of breast cancer - left 2021   • History of abnormal cervical Pap smear - LEEP for LGSIL 2013 2021   • Liver cyst 2021   • Fatty liver 2021   • History of colon polyps 10/30/2019   • Hemorrhoids 10/30/2019   • Rectal bleeding 10/30/2019     She  has a past surgical history that includes Colonoscopy (2021); Tubal ligation; Cholecystectomy;  Mohs surgery; Breast lumpectomy; Mammo (historical) (Bilateral, 01/11/2022); Breast biopsy (2009); pr conization cervix,knife/laser; DXA procedure(historical) (12/2020); Dilation and curettage of uterus; Tonsillectomy; Upper gastrointestinal endoscopy; and Colonoscopy  Her family history includes Cancer in her father, maternal aunt, and mother; Ovarian cancer in her cousin; Stomach cancer in her father; Stroke in her mother  She  reports that she quit smoking about 47 years ago  Her smoking use included cigarettes  She started smoking about 57 years ago  She smoked 0 00 packs per day for 10 00 years  She has never used smokeless tobacco  She reports current alcohol use of about 16 0 - 25 0 standard drinks of alcohol per week  She reports current drug use  Drug: Marijuana  Current Outpatient Medications   Medication Sig Dispense Refill   • Ascorbic Acid (vitamin C) 1000 MG tablet Take 1,000 mg by mouth daily     • Calcium Carbonate+Vitamin D 600-200 MG-UNIT TABS Take 1 tablet by mouth every 24 hours     • cholecalciferol (VITAMIN D3) 1,000 units tablet Take 2,000 Units by mouth daily     • fish oil-omega-3 fatty acids 1000 MG capsule Take 1 capsule by mouth daily     • IODINE EX Apply topically daily     • Multiple Vitamins-Minerals (CENTRUM SILVER 50+WOMEN PO) Take 1 tablet by mouth every 24 hours     • omeprazole (PriLOSEC) 20 mg delayed release capsule Take 1 capsule (20 mg total) by mouth daily 90 capsule 3   • Red Yeast Rice 600 MG CAPS      • vitamin E 600 UNIT capsule Take 1 capsule (600 Units total) by mouth daily 30 capsule 11   • vitamin k 100 MCG tablet Take 1 tablet by mouth every 24 hours     • multivitamin (THERAGRAN) TABS Take 1 tablet by mouth daily       • Red Yeast Rice Extract (RED YEAST RICE PO) Take by mouth 2 (two) times a day         No current facility-administered medications for this visit  She has No Known Allergies       Review of Systems   Intermittent bulging  No bladder or bowel dysfunction   No sig incontinence      Objective:      /74 (BP Location: Right arm, Patient Position: Sitting, Cuff Size: Standard)   Ht 5' 4" (1 626 m)   Wt 76 7 kg (169 lb)   BMI 29 01 kg/m²          Physical Exam    General appearance: no distress, pleasant  Pelvic exam: normal atrophic external genitalia, urethral meatus normal, vagina atrophic grade III midline cystocele, grade II rectocele, redundant vagina ?enterocele, cervix atrophic without sig descencus, uterus small, non tender, no adnexal masses, non tender  Rectal exam: normal sphincter tone, no masses, RV confirms above

## 2022-11-10 ENCOUNTER — OFFICE VISIT (OUTPATIENT)
Dept: OBGYN CLINIC | Facility: CLINIC | Age: 67
End: 2022-11-10

## 2022-11-10 VITALS
HEIGHT: 64 IN | SYSTOLIC BLOOD PRESSURE: 120 MMHG | WEIGHT: 169 LBS | BODY MASS INDEX: 28.85 KG/M2 | DIASTOLIC BLOOD PRESSURE: 74 MMHG

## 2022-11-10 DIAGNOSIS — N81.11 MIDLINE CYSTOCELE: Primary | ICD-10-CM

## 2022-11-10 DIAGNOSIS — Z85.3 PERSONAL HISTORY OF BREAST CANCER: ICD-10-CM

## 2022-11-10 DIAGNOSIS — Z12.31 ENCOUNTER FOR SCREENING MAMMOGRAM FOR MALIGNANT NEOPLASM OF BREAST: ICD-10-CM

## 2022-11-10 RX ORDER — AMPICILLIN TRIHYDRATE 250 MG
CAPSULE ORAL
COMMUNITY
Start: 2022-11-08

## 2022-11-10 NOTE — ASSESSMENT & PLAN NOTE
70 yo  who has noted intermittent bulging  No bladder or bowel dysfunction  Exam with grade III midline cystocele and grade II rectocele with valsalva  Good cervical support  Discussed options for prolapse including to conservatively follow with exams, pessary use or surgery  Would like to follow at this time  Mammo order placed     RTO 2023

## 2022-11-10 NOTE — PATIENT INSTRUCTIONS
You have a cystocele and a small rectocele (hernias of the vagina)  Kegel exercises  You can teach yourself the exercises by stopping your urine flow on the toilet  Then hold for 10 seconds, 10 times in a row  If you do that set of exercises 2-3 times per day, you will gain strength, then when you sneeze you can squeeze up first and getter better control  Let us know if you are interested in the pessary or surgical options

## 2022-12-02 LAB
ALBUMIN SERPL-MCNC: 4.6 G/DL (ref 3.8–4.8)
ALBUMIN/GLOB SERPL: 2.1 {RATIO} (ref 1.2–2.2)
ALP SERPL-CCNC: 69 IU/L (ref 44–121)
ALT SERPL-CCNC: 19 IU/L (ref 0–32)
AST SERPL-CCNC: 29 IU/L (ref 0–40)
BASOPHILS # BLD AUTO: 0 X10E3/UL (ref 0–0.2)
BASOPHILS NFR BLD AUTO: 1 %
BILIRUB SERPL-MCNC: 0.6 MG/DL (ref 0–1.2)
BUN SERPL-MCNC: 14 MG/DL (ref 8–27)
BUN/CREAT SERPL: 18 (ref 12–28)
CALCIUM SERPL-MCNC: 9.3 MG/DL (ref 8.7–10.3)
CHLORIDE SERPL-SCNC: 103 MMOL/L (ref 96–106)
CO2 SERPL-SCNC: 24 MMOL/L (ref 20–29)
CREAT SERPL-MCNC: 0.78 MG/DL (ref 0.57–1)
EGFR: 82 ML/MIN/1.73
EOSINOPHIL # BLD AUTO: 0.1 X10E3/UL (ref 0–0.4)
EOSINOPHIL NFR BLD AUTO: 2 %
ERYTHROCYTE [DISTWIDTH] IN BLOOD BY AUTOMATED COUNT: 12.1 % (ref 11.7–15.4)
GLOBULIN SER-MCNC: 2.2 G/DL (ref 1.5–4.5)
GLUCOSE SERPL-MCNC: 93 MG/DL (ref 70–99)
HCT VFR BLD AUTO: 40.2 % (ref 34–46.6)
HGB BLD-MCNC: 13.8 G/DL (ref 11.1–15.9)
IMM GRANULOCYTES # BLD: 0 X10E3/UL (ref 0–0.1)
IMM GRANULOCYTES NFR BLD: 0 %
LYMPHOCYTES # BLD AUTO: 1.2 X10E3/UL (ref 0.7–3.1)
LYMPHOCYTES NFR BLD AUTO: 36 %
MCH RBC QN AUTO: 32.5 PG (ref 26.6–33)
MCHC RBC AUTO-ENTMCNC: 34.3 G/DL (ref 31.5–35.7)
MCV RBC AUTO: 95 FL (ref 79–97)
MONOCYTES # BLD AUTO: 0.4 X10E3/UL (ref 0.1–0.9)
MONOCYTES NFR BLD AUTO: 12 %
NEUTROPHILS # BLD AUTO: 1.7 X10E3/UL (ref 1.4–7)
NEUTROPHILS NFR BLD AUTO: 49 %
PLATELET # BLD AUTO: 238 X10E3/UL (ref 150–450)
POTASSIUM SERPL-SCNC: 4.7 MMOL/L (ref 3.5–5.2)
PROT SERPL-MCNC: 6.8 G/DL (ref 6–8.5)
RBC # BLD AUTO: 4.24 X10E6/UL (ref 3.77–5.28)
SODIUM SERPL-SCNC: 142 MMOL/L (ref 134–144)
WBC # BLD AUTO: 3.4 X10E3/UL (ref 3.4–10.8)

## 2023-01-24 ENCOUNTER — TELEPHONE (OUTPATIENT)
Dept: OBGYN CLINIC | Facility: CLINIC | Age: 68
End: 2023-01-24

## 2023-01-24 NOTE — TELEPHONE ENCOUNTER
Chely inquiring about her Mammogram results from 01/17/23 test date  Informed Chely of [facility] are approximately three weeks behind in [facility] John Wei verbally understood

## 2023-01-26 NOTE — TELEPHONE ENCOUNTER
[facility]  Per Epic, result unavailable, checked [facility], result unavailable at performing facility  Return call to Ramo Springer who states it was recommended to her by [facility] staff to have [facility] call to expedite reading of screening mammogram  Inquired if she has a current breast health concern? Patient denies current concern  Explained unfortunately, cannot call to expedite one screening mammogram over another, current mammogram results are taking 3-4 weeks  Reassured if she had a breast health issue results would be obtained in a timely fashion  Recommended she call back next week, will be more than happy to check [facility] website again for her  Patient verbalized understanding and appreciation of explanation

## 2023-02-13 DIAGNOSIS — Z12.31 ENCOUNTER FOR SCREENING MAMMOGRAM FOR MALIGNANT NEOPLASM OF BREAST: ICD-10-CM

## 2023-02-13 DIAGNOSIS — Z85.3 PERSONAL HISTORY OF BREAST CANCER: ICD-10-CM

## 2023-02-14 PROBLEM — Z01.419 ENCOUNTER FOR GYNECOLOGICAL EXAMINATION (GENERAL) (ROUTINE) WITHOUT ABNORMAL FINDINGS: Status: ACTIVE | Noted: 2023-02-14

## 2023-02-14 NOTE — PROGRESS NOTES
Assessment/Plan:    Encounter for gynecological examination (general) (routine) without abnormal findings  Here for well check, notes increase in prolapse symptoms  Normal breast and unchanged pelvic exams  Last pap 11/2017 neg/HPV neg, repeated today  Mammo order given, last 1/17/23  Colonoscopy 7/2021, due 5-10 yrs (Dr Gio Bro)  Dexa 12/2020, orders given to repeat  Midline cystocele  Reports increased awareness of bulging when active, as when skiing  No bladder control issues or bleeding  Exam unchanged  Options reviewed for symptomatic cystocele including pessary use or surgery  Of note, used diaphragm in past without issue  Fitted for #5 ring with support, will attempt therapy non-surgically and consider surgery if failed  Osteopenia of left femoral neck  Last dexa reviewed, repeat orders given  Personal history of breast cancer - left  Left, 2009  No change in self or clinical breast exams  Mammo order given  14 years SB       Diagnoses and all orders for this visit:    Encounter for gynecological examination (general) (routine) without abnormal findings    Midline cystocele  -     Pessary    History of abnormal cervical Pap smear - LEEP for LGSIL 2013    Personal history of breast cancer - left    Osteopenia of left femoral neck  -     DXA bone density spine hip and pelvis; Future    Screening for malignant neoplasm of the cervix  -     IGP, Aptima HPV, Rfx 16/18,45          Subjective:      Patient ID: Afshin Holland is a 69 y o  female  HPI Here for Medicare biannual breast and pelvic exams       The following portions of the patient's history were reviewed and updated as appropriate:   She  has a past medical history of Abnormal Pap smear of cervix, Torrez esophagus (2021), Torrez's esophagus, Breast cancer ([address] ), Cancer ([address] ) (2009), Cholelithiasis, Colon polyp, Fatty liver, Female infertility (Early 1980's), GERD (gastroesophageal reflux disease), Hemorrhoids, Hypothyroidism (1970's), Miscarriage, Osteopenia, Polycystic ovary syndrome (1970's), Pulmonary arterial hypertension ([address] ) (2017?), and Varicella (Childhood)  She   Patient Active Problem List    Diagnosis Date Noted   • Osteopenia of left femoral neck 02/16/2023   • Encounter for gynecological examination (general) (routine) without abnormal findings 02/14/2023   • Midline cystocele 11/10/2022   • Torrze's esophagus without dysplasia 09/02/2022   • Personal history of breast cancer - left 12/02/2021   • History of abnormal cervical Pap smear - LEEP for LGSIL 2013 12/02/2021   • Liver cyst 06/16/2021   • Fatty liver 06/16/2021   • History of colon polyps 10/30/2019   • Hemorrhoids 10/30/2019   • Rectal bleeding 10/30/2019     She  has a past surgical history that includes Colonoscopy (07/2021); Tubal ligation; Cholecystectomy; Mohs surgery; Breast lumpectomy; Mammo (historical) (Bilateral, 01/11/2022); Breast biopsy (2009); pr conization cervix w/wo d&c rpr knife/laser; DXA procedure(historical) (12/2020); Dilation and curettage of uterus; Tonsillectomy; Upper gastrointestinal endoscopy; and Colonoscopy  Her family history includes Cancer in her father, maternal aunt, and mother; Ovarian cancer in her cousin; Stomach cancer in her father; Stroke in her mother  She  reports that she quit smoking about 48 years ago  Her smoking use included cigarettes  She started smoking about 58 years ago  She has never used smokeless tobacco  She reports current alcohol use of about 16 0 - 25 0 standard drinks per week  She reports current drug use  Drug: Marijuana    Current Outpatient Medications   Medication Sig Dispense Refill   • Ascorbic Acid (vitamin C) 1000 MG tablet Take 1,000 mg by mouth daily     • Calcium Carbonate+Vitamin D 600-200 MG-UNIT TABS Take 1 tablet by mouth every 24 hours     • cholecalciferol (VITAMIN D3) 1,000 units tablet Take 2,000 Units by mouth daily     • fish oil-omega-3 fatty acids 1000 MG capsule Take 1 capsule by mouth daily     • IODINE EX Apply topically daily     • Multiple Vitamins-Minerals (CENTRUM SILVER 50+WOMEN PO) Take 1 tablet by mouth every 24 hours     • multivitamin (THERAGRAN) TABS Take 1 tablet by mouth daily       • omeprazole (PriLOSEC) 20 mg delayed release capsule Take 1 capsule (20 mg total) by mouth daily 90 capsule 3   • Red Yeast Rice 600 MG CAPS      • Red Yeast Rice Extract (RED YEAST RICE PO) Take by mouth 2 (two) times a day       • vitamin E 600 UNIT capsule Take 1 capsule (600 Units total) by mouth daily 30 capsule 11   • vitamin k 100 MCG tablet Take 1 tablet by mouth every 24 hours       No current facility-administered medications for this visit  She has No Known Allergies       Review of Systems      Objective:      /64 (BP Location: Right arm, Patient Position: Sitting, Cuff Size: Standard)   Ht 5' 5" (1 651 m)   Wt 76 2 kg (168 lb)   BMI 27 96 kg/m²          Physical Exam    Appears well, no apparent distress  Does not appear anxious or depressed  Neck: thyroid normal size without nodules, no palpable adenopathy  Breasts: no masses, nodes, skin changes  S/p left lumpectomy and XRT  Abdomen: soft, non tender, non tender liver edge  Pelvic exam: normal atrophic external genitalia, urethral meatus normal, vagina atrophic grade III midline cystocele, grade II rectocele, redundant vagina ?enterocele, cervix almost flush, atrophic without sig descencus, uterus small, non tender, no adnexal masses, non tender  Rectal exam: normal sphincter tone, no masses, RV confirms above    Pessary    Date/Time: 2/16/2023 10:38 AM  Performed by: Valentine Lennox, MD  Authorized by:  Valentine Lennox, MD   Universal Protocol:  Consent given by: patient  Patient understanding: patient states understanding of the procedure being performed  Patient identity confirmed: verbally with patient      Indication:     Indication for pessary: cystocele    Pre-procedure:     Pessary procedure type:  Fitting  Procedure:     Pessary type:  Ring w/ support    Pessary size:  5  Comments:     Procedure comments:  Fitting complete  Will RTO for insertion when received  #5 ring maintained with valsalva

## 2023-02-16 ENCOUNTER — OFFICE VISIT (OUTPATIENT)
Dept: OBGYN CLINIC | Facility: CLINIC | Age: 68
End: 2023-02-16

## 2023-02-16 VITALS
DIASTOLIC BLOOD PRESSURE: 64 MMHG | SYSTOLIC BLOOD PRESSURE: 112 MMHG | WEIGHT: 168 LBS | HEIGHT: 65 IN | BODY MASS INDEX: 27.99 KG/M2

## 2023-02-16 DIAGNOSIS — Z87.42 HISTORY OF ABNORMAL CERVICAL PAP SMEAR: ICD-10-CM

## 2023-02-16 DIAGNOSIS — M85.852 OSTEOPENIA OF LEFT FEMORAL NECK: ICD-10-CM

## 2023-02-16 DIAGNOSIS — Z01.419 ENCOUNTER FOR GYNECOLOGICAL EXAMINATION (GENERAL) (ROUTINE) WITHOUT ABNORMAL FINDINGS: Primary | ICD-10-CM

## 2023-02-16 DIAGNOSIS — Z12.4 SCREENING FOR MALIGNANT NEOPLASM OF THE CERVIX: ICD-10-CM

## 2023-02-16 DIAGNOSIS — Z85.3 PERSONAL HISTORY OF BREAST CANCER: ICD-10-CM

## 2023-02-16 DIAGNOSIS — N81.11 MIDLINE CYSTOCELE: ICD-10-CM

## 2023-02-16 NOTE — PATIENT INSTRUCTIONS
Return to office in one year unless having any problems such as breast changes, bleeding, new persistent pain, new progressive bloating, new problems eating (getting full to quickly) or new constant urinary pressure that does not resolve in one week  Call in six months to schedule your annual visit  If you do not hear about your pessary in 2 weeks, please call  You have a cystocele which is a weakness in the vaginal wall below the bladder

## 2023-02-16 NOTE — ASSESSMENT & PLAN NOTE
Reports increased awareness of bulging when active, as when skiing  No bladder control issues or bleeding  Exam unchanged  Options reviewed for symptomatic cystocele including pessary use or surgery  Of note, used diaphragm in past without issue  Fitted for #5 ring with support, will attempt therapy non-surgically and consider surgery if failed

## 2023-02-16 NOTE — LETTER
February 16, 2023     [address]    Patient: Hattie Pardo   YOB: 1955   Date of Visit: 2/16/2023       Dear Dr Archana Palafox: Thank you for referring Bibi Suarez to me for evaluation  Below are my notes for this consultation  If you have questions, please do not hesitate to call me  I look forward to following your patient along with you  Sincerely,        Ranjit Fields MD        CC: No Recipients  Ranjit Fields MD  2/16/2023 10:58 AM  Sign when Signing Visit  Assessment/Plan:    Encounter for gynecological examination (general) (routine) without abnormal findings  Here for well check, notes increase in prolapse symptoms  Normal breast and unchanged pelvic exams  Last pap 11/2017 neg/HPV neg, repeated today  Mammo order given, last 1/17/23  Colonoscopy 7/2021, due 5-10 yrs (Dr Cesar Khan)  Dexa 12/2020, orders given to repeat  Midline cystocele  Reports increased awareness of bulging when active, as when skiing  No bladder control issues or bleeding  Exam unchanged  Options reviewed for symptomatic cystocele including pessary use or surgery  Of note, used diaphragm in past without issue  Fitted for #5 ring with support, will attempt therapy non-surgically and consider surgery if failed  Osteopenia of left femoral neck  Last dexa reviewed, repeat orders given  Personal history of breast cancer - left  Left, 2009  No change in self or clinical breast exams  Mammo order given  14 years SB      Diagnoses and all orders for this visit:    Encounter for gynecological examination (general) (routine) without abnormal findings    Midline cystocele  -     Pessary    History of abnormal cervical Pap smear - LEEP for LGSIL 2013    Personal history of breast cancer - left    Osteopenia of left femoral neck  -     DXA bone density spine hip and pelvis;  Future    Screening for malignant neoplasm of the cervix  -     IGP, Aptima HPV, Rfx 16/18,45         Subjective:     Patient ID: Steve Rainey is a 69 y o  female  HPI Here for Medicare biannual breast and pelvic exams  The following portions of the patient's history were reviewed and updated as appropriate:   She  has a past medical history of Abnormal Pap smear of cervix, Torrez esophagus (2021), Torrez's esophagus, Breast cancer ([address] ), Cancer ([address] ) (2009), Cholelithiasis, Colon polyp, Fatty liver, Female infertility (Early 1980's), GERD (gastroesophageal reflux disease), Hemorrhoids, Hypothyroidism (1970's), Miscarriage, Osteopenia, Polycystic ovary syndrome (1970's), Pulmonary arterial hypertension ([address] ) (2017?), and Varicella (Childhood)  She   Patient Active Problem List    Diagnosis Date Noted   • Osteopenia of left femoral neck 02/16/2023   • Encounter for gynecological examination (general) (routine) without abnormal findings 02/14/2023   • Midline cystocele 11/10/2022   • Torrez's esophagus without dysplasia 09/02/2022   • Personal history of breast cancer - left 12/02/2021   • History of abnormal cervical Pap smear - LEEP for LGSIL 2013 12/02/2021   • Liver cyst 06/16/2021   • Fatty liver 06/16/2021   • History of colon polyps 10/30/2019   • Hemorrhoids 10/30/2019   • Rectal bleeding 10/30/2019     She  has a past surgical history that includes Colonoscopy (07/2021); Tubal ligation; Cholecystectomy; Mohs surgery; Breast lumpectomy; Mammo (historical) (Bilateral, 01/11/2022); Breast biopsy (2009); pr conization cervix w/wo d&c rpr knife/laser; DXA procedure(historical) (12/2020); Dilation and curettage of uterus; Tonsillectomy; Upper gastrointestinal endoscopy; and Colonoscopy  Her family history includes Cancer in her father, maternal aunt, and mother; Ovarian cancer in her cousin; Stomach cancer in her father; Stroke in her mother  She  reports that she quit smoking about 48 years ago  Her smoking use included cigarettes   She started smoking about 58 years ago  She has never used smokeless tobacco  She reports current alcohol use of about 16 0 - 25 0 standard drinks per week  She reports current drug use  Drug: Marijuana  Current Outpatient Medications   Medication Sig Dispense Refill   • Ascorbic Acid (vitamin C) 1000 MG tablet Take 1,000 mg by mouth daily     • Calcium Carbonate+Vitamin D 600-200 MG-UNIT TABS Take 1 tablet by mouth every 24 hours     • cholecalciferol (VITAMIN D3) 1,000 units tablet Take 2,000 Units by mouth daily     • fish oil-omega-3 fatty acids 1000 MG capsule Take 1 capsule by mouth daily     • IODINE EX Apply topically daily     • Multiple Vitamins-Minerals (CENTRUM SILVER 50+WOMEN PO) Take 1 tablet by mouth every 24 hours     • multivitamin (THERAGRAN) TABS Take 1 tablet by mouth daily       • omeprazole (PriLOSEC) 20 mg delayed release capsule Take 1 capsule (20 mg total) by mouth daily 90 capsule 3   • Red Yeast Rice 600 MG CAPS      • Red Yeast Rice Extract (RED YEAST RICE PO) Take by mouth 2 (two) times a day       • vitamin E 600 UNIT capsule Take 1 capsule (600 Units total) by mouth daily 30 capsule 11   • vitamin k 100 MCG tablet Take 1 tablet by mouth every 24 hours       No current facility-administered medications for this visit  She has No Known Allergies       Review of Systems     Objective:      /64 (BP Location: Right arm, Patient Position: Sitting, Cuff Size: Standard)   Ht 5' 5" (1 651 m)   Wt 76 2 kg (168 lb)   BMI 27 96 kg/m²         Physical Exam   Appears well, no apparent distress  Does not appear anxious or depressed  Neck: thyroid normal size without nodules, no palpable adenopathy  Breasts: no masses, nodes, skin changes   S/p left lumpectomy and XRT  Abdomen: soft, non tender, non tender liver edge  Pelvic exam: normal atrophic external genitalia, urethral meatus normal, vagina atrophic grade III midline cystocele, grade II rectocele, redundant vagina ?enterocele, cervix almost flush, atrophic without sig descencus, uterus small, non tender, no adnexal masses, non tender  Rectal exam: normal sphincter tone, no masses, RV confirms above    Pessary    Date/Time: 2/16/2023 10:38 AM  Performed by: Cassia Venegas MD  Authorized by: Cassia Venegas MD   Universal Protocol:  Consent given by: patient  Patient understanding: patient states understanding of the procedure being performed  Patient identity confirmed: verbally with patient      Indication:     Indication for pessary: cystocele    Pre-procedure:     Pessary procedure type:  Fitting  Procedure:     Pessary type:  Ring w/ support    Pessary size:  5  Comments:     Procedure comments:  Fitting complete  Will RTO for insertion when received  #5 ring maintained with valsalva

## 2023-02-16 NOTE — ASSESSMENT & PLAN NOTE
Here for well check, notes increase in prolapse symptoms  Normal breast and unchanged pelvic exams  Last pap 11/2017 neg/HPV neg, repeated today  Mammo order given, last 1/17/23  Colonoscopy 7/2021, due 5-10 yrs (Dr Tasha Peralta)  Dexa 12/2020, orders given to repeat

## 2023-02-22 LAB
CYTOLOGIST CVX/VAG CYTO: NORMAL
DX ICD CODE: NORMAL
HPV GENOTYPE REFLEX: NORMAL
HPV I/H RISK 4 DNA CVX QL PROBE+SIG AMP: NEGATIVE
OTHER STN SPEC: NORMAL
PATH REPORT.FINAL DX SPEC: NORMAL
SL AMB NOTE:: NORMAL
SL AMB SPECIMEN ADEQUACY: NORMAL
SL AMB TEST METHODOLOGY: NORMAL

## 2023-03-03 NOTE — TELEPHONE ENCOUNTER
Chief Complaint   Patient presents with    Hypertension     6 month     Dizziness     Has been feeling light headed a lot        HPI:  Claire Connor is a 78 y.o. male    Follow up  Home bp numbers better than reading here today    Will get lightheaded     Was coming back from the bathroom in the middle of the night and had a syncopal episode      Patient Active Problem List   Diagnosis    Hyperlipidemia    Hypertension    BPH (benign prostatic hyperplasia)    Closed nondisplaced fracture of fifth cervical vertebra [facility])       Wife having ongoing issues with neurologic disease  Unable to walk, speech/balance/vision issues     He is main caregiver  Stressful     Urologist - Dr. Cecilia Elmore    Treatment Adherence:   Medication compliance:  compliant all of the time  Diet compliance:  compliant all of the time  Weight trend: stable  Current exercise: mowing lawn, golfing, caring for wife   Barriers: none    Hypertension:  Home blood pressure monitoring: Yes - good. He is adherent to a low sodium diet. Patient denies chest pain, shortness of breath, headache, blurred vision, peripheral edema, palpitations, dry cough and fatigue. Antihypertensive medication side effects: no medication side effects noted and orthostatic lightheadedness. Use of agents associated with hypertension: none. Sodium (mEq/L)   Date Value   08/26/2022 139    BUN (mg/dL)   Date Value   08/26/2022 32 (H)    Glucose (mg/dL)   Date Value   08/26/2022 112 (H)      Potassium (mEq/L)   Date Value   08/26/2022 4.5    Creatinine (mg/dL)   Date Value   08/26/2022 1.32 (H)         Hyperlipidemia:  No new myalgias or GI upset on simvastatin (Zocor).      Lab Results   Component Value Date    CHOL 117 02/15/2022    TRIG 77 02/15/2022    HDL 43 02/15/2022    LDLCALC 59 02/15/2022     Lab Results   Component Value Date    ALT 20 08/26/2022    AST 29 08/26/2022          BPH:  Doing ok on proscar and uroxatral      Past Would continue the Miralax  OK to discuss at follow up  Medical History:   Diagnosis Date    Hyperlipidemia     Hypertension, well controlled      Past Surgical History:   Procedure Laterality Date    CATARACT REMOVAL  5/2012    bilateral    COLONOSCOPY  7/23/2008     History reviewed. No pertinent family history. Social History     Socioeconomic History    Marital status:      Spouse name: None    Number of children: None    Years of education: None    Highest education level: None   Tobacco Use    Smoking status: Former    Smokeless tobacco: Never     Social Determinants of Health     Financial Resource Strain: Low Risk     Difficulty of Paying Living Expenses: Not hard at all   Food Insecurity: No Food Insecurity    Worried About [address] in the Last Year: Never true    [email or web address] in the Last Year: Never true   Transportation Needs: Unknown    Lack of Transportation (Non-Medical): No   Housing Stability: Unknown    Unstable Housing in the Last Year: No     Current Outpatient Medications   Medication Sig Dispense Refill    alfuzosin (UROXATRAL) 10 MG extended release tablet TAKE 1 TABLET DAILY 90 tablet 3    metoprolol succinate (TOPROL XL) 25 MG extended release tablet TAKE 1 TABLET DAILY 90 tablet 3    simvastatin (ZOCOR) 20 MG tablet TAKE 1 TABLET NIGHTLY 90 tablet 3    lisinopril-hydroCHLOROthiazide (PRINZIDE;ZESTORETIC) 10-12.5 MG per tablet Take 1 tablet by mouth every evening 90 tablet 3    finasteride (PROSCAR) 5 MG tablet TAKE 1 TABLET DAILY 90 tablet 3    aspirin 81 MG EC tablet Take 81 mg by mouth daily       No current facility-administered medications for this visit.      No Known Allergies    Review of Systems:   General ROS: some fatigue negative for - chills,fever, malaise, weight gain or weight loss  Respiratory ROS: no cough, shortness of breath, or wheezing  Cardiovascular ROS: no chest pain or dyspnea on exertion  Gastrointestinal ROS: no abdominal pain, change in bowel habits, or black or bloody stools  Genito-Urinary ROS: no dysuria, trouble voiding, hx BPH  Musculoskeletal ROS: left leg pain  Neurological ROS: tingling right side of face intermittently   Psych: mood ok  In general patient otherwise reports feeling well. Physical Exam:  BP (!) 172/77 (Site: Right Upper Arm, Position: Sitting, Cuff Size: Medium Adult)   Pulse 65   Temp 97.3 °F (36.3 °C)   Ht 5' 10\" (1.778 m)   Wt 157 lb (71.2 kg)   SpO2 98%   BMI 22.53 kg/m²     Gen: Well, NAD, Alert, Oriented x 3   HEENT: EOMI, eyes clear, MMM  Skin: without rash or jaundice,   Neck: no significant lymphadenopathy or thyromegaly  Lungs: CTA B w/out Rales/Wheezes/Rhonchi, Good respiratory effort   Heart: RRR, S1S2, w/out M/R/G, non-displaced PMI   Ext: No C/C/E Bilaterally. Neuro: Neurovascularly intact w/ Sensory/Motor intact UE/LE Bilaterally. Psych: euthymic    Lab Results   Component Value Date    WBC 5.5 08/26/2022    HGB 12.3 (L) 08/26/2022    HCT 36.6 (L) 08/26/2022     08/26/2022    CHOL 117 02/15/2022    TRIG 77 02/15/2022    HDL 43 02/15/2022    ALT 20 08/26/2022    AST 29 08/26/2022     08/26/2022    K 4.5 08/26/2022     08/26/2022    CREATININE 1.32 (H) 08/26/2022    BUN 32 (H) 08/26/2022    CO2 25 08/26/2022    TSH 2.680 06/24/2015    INR 1.1 02/25/2021    LABA1C 5.8 08/26/2022           A&P   Diagnosis Orders   1. Orthostatic lightheadedness        2. Essential hypertension  metoprolol succinate (TOPROL XL) 25 MG extended release tablet    Lipid Panel    Comprehensive Metabolic Panel    CBC    lisinopril-hydroCHLOROthiazide (PRINZIDE;ZESTORETIC) 10-12.5 MG per tablet      3. Hyperlipidemia, unspecified hyperlipidemia type  simvastatin (ZOCOR) 20 MG tablet      4. Caregiver stress        5. Other fatigue  TSH      6.  At high risk for falls          More water intake    Reduce bp med    NAC supplement     Check labs     Continue to monitor bp at home    Caregiver Stress ongoing    Fasting labs      Josy Verdugo MD  On the basis of positive falls risk screening, assessment and plan is as follows: home safety tips provided.

## 2023-03-14 ENCOUNTER — OFFICE VISIT (OUTPATIENT)
Dept: OBGYN CLINIC | Facility: CLINIC | Age: 68
End: 2023-03-14

## 2023-03-14 VITALS — WEIGHT: 168 LBS | DIASTOLIC BLOOD PRESSURE: 80 MMHG | BODY MASS INDEX: 27.96 KG/M2 | SYSTOLIC BLOOD PRESSURE: 126 MMHG

## 2023-03-14 DIAGNOSIS — N81.11 MIDLINE CYSTOCELE: Primary | ICD-10-CM

## 2023-03-14 NOTE — PATIENT INSTRUCTIONS
Return to office as scheduled in 3-4 months unless experiencing problems with pessary such as bleeding, increase in discharge or foul odor

## 2023-03-14 NOTE — PROGRESS NOTES
Pessary    Date/Time: 3/14/2023 11:50 AM  Performed by: Jennifer Acharya MD  Authorized by: Jennifer Acharya MD   Universal Protocol:  Consent: Verbal consent obtained  Consent given by: patient  Patient understanding: patient states understanding of the procedure being performed  Patient identity confirmed: verbally with patient      Indication:     Indication for pessary: cystocele    Pre-procedure:     Pessary procedure type: Insertion  Procedure:     Pessary type:  Ring w/ support    Pessary size:  5    Patient tolerance of procedure: Tolerated well, no immediate complications  Comments:     Procedure comments:  New #5 pessary ring with support inserted, maintained with valsalva

## 2023-03-17 ENCOUNTER — TELEPHONE (OUTPATIENT)
Dept: OBGYN CLINIC | Facility: CLINIC | Age: 68
End: 2023-03-17

## 2023-03-17 DIAGNOSIS — R30.0 DYSURIA: Primary | ICD-10-CM

## 2023-03-17 NOTE — TELEPHONE ENCOUNTER
Patient called stating that she just saw Dr Kim Gee recently on 03/14/23 and had her pessary cleaned  Yesterday when she had a bowel movement, her pessary fell out and she pushed it back up, then she noticed yesterday she had some pink tinge blood after inserting the pessary but none since  However she still have the burning with urination and wishes for Dr Kim Gee to provide her recommendation as [place] [facility]  Call back # [phone]  Advised pt if she does not hear back from Dr Kim Gee, advised her to follow-up at a local urgent care in [facility] regarding possible UTI  She verbalized understanding  Dr Kim Gee please advise

## 2023-03-20 RX ORDER — NITROFURANTOIN 25; 75 MG/1; MG/1
100 CAPSULE ORAL 2 TIMES DAILY
Qty: 14 CAPSULE | Refills: 0 | Status: SHIPPED | OUTPATIENT
Start: 2023-03-20

## 2023-03-20 NOTE — TELEPHONE ENCOUNTER
Sent rx macrobid twice daily for 7 days to pharmacy  If symptoms increase should see urgent care as recommended

## 2023-03-20 NOTE — TELEPHONE ENCOUNTER
Left detailed message on [phone] # as requested  Antibiotic called to her [facility] for twice daily treatment x 7 days  If no improvement or symptoms become worse needs eval at urgent care in [address] [place] [address]

## 2023-03-21 NOTE — PATIENT INSTRUCTIONS
Hemorrhoids   WHAT YOU NEED TO KNOW:   Hemorrhoids are swollen blood vessels inside your rectum (internal hemorrhoids) or on your anus (external hemorrhoids)  Sometimes a hemorrhoid may prolapse  This means it extends out of your anus  DISCHARGE INSTRUCTIONS:   Return to the emergency department if:   · You have severe pain in your rectum or around your anus  · You have severe pain in your abdomen and you are vomiting  · You have bleeding from your anus that soaks through your underwear  Contact your healthcare provider if:   · You have frequent and painful bowel movements  · Your hemorrhoid looks or feels more swollen than usual      · You do not have a bowel movement for 2 days or more  · You see or feel tissue coming through your anus  · You have questions or concerns about your condition or care  Medicines: You may  need any of the following:  · A pad, cream, or ointment  can help decrease pain, swelling, and itching  · Stool softeners  help treat or prevent constipation  · NSAIDs , such as ibuprofen, help decrease swelling, pain, and fever  NSAIDs can cause stomach bleeding or kidney problems in certain people  If you take blood thinner medicine, always ask your healthcare provider if NSAIDs are safe for you  Always read the medicine label and follow directions  · Take your medicine as directed  Contact your healthcare provider if you think your medicine is not helping or if you have side effects  Tell him or her if you are allergic to any medicine  Keep a list of the medicines, vitamins, and herbs you take  Include the amounts, and when and why you take them  Bring the list or the pill bottles to follow-up visits  Carry your medicine list with you in case of an emergency  Manage your symptoms:   · Apply ice on your anus for 15 to 20 minutes every hour or as directed  Use an ice pack, or put crushed ice in a plastic bag   Cover it with a towel before you apply it to your anus  Ice helps prevent tissue damage and decreases swelling and pain  · Take a sitz bath  Fill a bathtub with 4 to 6 inches of warm water  You may also use a sitz bath pan that fits inside a toilet bowl  Sit in the sitz bath for 15 minutes  Do this 3 times a day, and after each bowel movement  The warm water can help decrease pain and swelling  · Keep your anal area clean  Gently wash the area with warm water daily  Soap may irritate the area  After a bowel movement, wipe with moist towelettes or wet toilet paper  Dry toilet paper can irritate the area  Prevent hemorrhoids:   · Do not strain to have a bowel movement  Do not sit on the toilet too long  These actions can increase pressure on the tissues in your rectum and anus  · Drink plenty of liquids  Liquids can help prevent constipation  Ask how much liquid to drink each day and which liquids are best for you  · Eat a variety of high-fiber foods  Examples include fruits, vegetables, and whole grains  Ask your healthcare provider how much fiber you need each day  You may need to take a fiber supplement  · Exercise as directed  Exercise, such as walking, may make it easier to have a bowel movement  Ask your healthcare provider to help you create an exercise plan  · Do not have anal sex  Anal sex can weaken the skin around your rectum and anus  · Avoid heavy lifting  This can cause straining and increase your risk for another hemorrhoid  Follow up with your healthcare provider as directed:  Write down your questions so you remember to ask them during your visits  © 2017 [facility] Information is for End User's use only and may not be sold, redistributed or otherwise used for commercial purposes  All illustrations and images included in CareNotes® are the copyrighted property of [employer] , [employer]  or Farhat Welch  The above information is an  only   It is not intended as medical advice for individual conditions or treatments  Talk to your doctor, nurse or pharmacist before following any medical regimen to see if it is safe and effective for you  Bexarotene Counseling:  I discussed with the patient the risks of bexarotene including but not limited to hair loss, dry lips/skin/eyes, liver abnormalities, hyperlipidemia, pancreatitis, depression/suicidal ideation, photosensitivity, drug rash/allergic reactions, hypothyroidism, anemia, leukopenia, infection, cataracts, and teratogenicity.  Patient understands that they will need regular blood tests to check lipid profile, liver function tests, white blood cell count, thyroid function tests and pregnancy test if applicable.

## 2023-03-27 ENCOUNTER — TELEPHONE (OUTPATIENT)
Dept: OBGYN CLINIC | Facility: CLINIC | Age: 68
End: 2023-03-27

## 2023-03-27 DIAGNOSIS — R30.0 DYSURIA: Primary | ICD-10-CM

## 2023-03-27 NOTE — TELEPHONE ENCOUNTER
Chely did not  Macrobid Rx on Monday 03/20 prior to her trip to [facility]  She states did not have severe urinary symptoms to warrant going to an Urgent Care facility  Chely said was Skiing all day, not drinking enough fluids;when did drink had no problem urinating  Chely is experiencing occasional discomfort at end of urination, no fever or chills, no lower abd or flank pains, no odor noted to urine  Chely questioning, if should  ABx  Or do urine testing  Sent message to Dr Ladarius Kilpatrick

## 2023-03-27 NOTE — TELEPHONE ENCOUNTER
Notified patient lab orders transmitted to [address] [place] [address] for urinalysis and culture   Patient verbalized understanding

## 2023-03-29 LAB
BACTERIA UR CULT: ABNORMAL
Lab: ABNORMAL
SL AMB ANTIMICROBIAL SUSCEPTIBILITY: ABNORMAL

## 2023-03-30 ENCOUNTER — TELEPHONE (OUTPATIENT)
Dept: OBGYN CLINIC | Facility: CLINIC | Age: 68
End: 2023-03-30

## 2023-03-30 NOTE — TELEPHONE ENCOUNTER
Chely aware of urine results & to finish Abx  Darleen Chang did remove Pessary due to UTI  Lupe Duffy She is questioning about when to replace Pessary

## 2023-03-30 NOTE — TELEPHONE ENCOUNTER
Please inform pt of positive UC for Ecoli, sensitive to prescribed nitrofurantoin  She should  rx and complete course  Thanks

## 2023-03-30 NOTE — TELEPHONE ENCOUNTER
Informed Chely of Dr Corrinne Ohms message about replacing Pessary;did n't need to keep it out due to UTI  Donnalreena Moroccan

## 2023-04-27 DIAGNOSIS — M85.852 OSTEOPENIA OF LEFT FEMORAL NECK: ICD-10-CM

## 2023-05-23 ENCOUNTER — TELEPHONE (OUTPATIENT)
Dept: OBGYN CLINIC | Facility: CLINIC | Age: 68
End: 2023-05-23

## 2023-05-30 NOTE — PROGRESS NOTES
"[address], [address], [facility] [address] [facility]    Assessment/Plan:  1  Midline cystocele  Assessment & Plan:  On exam it appears current pessary is correcting cystocele but the rectocele pushes past it on valsalva  Reviewed pelvic prolapse with patient, different types and showed pictures to demonstrate  I reviewed that it appears the pessary is adequately treating the cystocele but pessaries often do not work well for rectoceles  Discussed that a different type of pessary maybe be more effective, but in my experience this is not really the case  She is not bothered by the rectocele - no pain, doesn't interfere with activities  She admits the pessary has helped her cystocele  Discussed that if she feels pessary is helping and is not bothered by the mild rectocele, she can continue to use it  If she is bothered by the rectocele she could discuss with Dr Patel if she feels a different pessary would work better or she could consider surgery, which Dr Patel can also provide  She wishes to continue pessary for now and is comfortable cleaning and replacing it  I discussed generally is she can manage cleaning of pessary, Dr Patel would only need to follow up annual, unless Matt Enriquez has an issue  I will confirm this with Dr Patel  Matt Enriquez will continue the pessary for now with self management and make an appointment if she develops further problems  2  Rectocele    3  Presence of pessary      I have spent a total time of 30 minutes on 05/31/23 in caring for this patient including Instructions for management, Patient education, Impressions, Counseling / Coordination of care, Documenting in the medical record, Reviewing / ordering tests, medicine, procedures  , Obtaining or reviewing history and exam       Subjective:   Lonia Degree is a 69 y o  [address] female  CC: \"I feel a bulge in front of pessary\"      HPI:   Matt Enriquez presents for pessary evaluation    She is a " "patient of Dr Bernadette Cline with a long history of pelvic floor prolapse  In March she was fitted and had a #5 ring pessary with support placed as prolapse symptoms had increased  Per Dr Uriel Gatica notes she had a \"grade 3 cystocele and grade 2 rectocele with valsalva\"  Chely was scheduled for follow up with Dr Bernadette Cline next month but called last week with c/o feeling a bulge near pessary, and was scheduled for exam today  She states she needed to cancel the  follow up anyway due to scheduling conflict  She reports she is able to remove and clean pessary and does so about weekly  She is not using any vaginal creams currently but does use some lubrication with reinsertion  She states she was doing fine but first noted a couple weeks ago a \"bulge in front of pessary\"  It's not always there  No pain or bleeding  No real change in her normal vaginal discharge  Overall has felt comfortable with pessary but is wondering what this is  Gyn History  No LMP recorded  Patient is postmenopausal        Last pap smear: 2023    She  reports being sexually active and has had partner(s) who are male  She reports using the following methods of birth control/protection: Post-menopausal and Female Sterilization         OB History      Past Medical History:  No date: Abnormal Pap smear of cervix  : Torrez esophagus  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  No date: [facility]  2009: Cancer ([facility] )      Comment:  left breast  No date: Cholelithiasis  No date: Colon polyp  No date: Fatty liver  Early 's: Female infertility  No date: GERD (gastroesophageal reflux disease)  No date: Hemorrhoids  's: Hypothyroidism  No date: Miscarriage      Comment:  2  No date: Osteopenia  1970's: Polycystic ovary syndrome  2017?: Pulmonary arterial hypertension ([facility] )      Comment:  Exercise induced  Childhood: Varicella     Past Surgical History:  2009: BREAST BIOPSY  No date: BREAST LUMPECTOMY  No date: " CHOLECYSTECTOMY  2021: COLONOSCOPY      Comment:  [facility]  No date: COLONOSCOPY  No date: DILATION AND CURETTAGE OF UTERUS      Comment:  X2  2020: DXA PROCEDURE (HISTORICAL)      Comment:  Osteopenia  2022: MAMMO (HISTORICAL); Bilateral      Comment:  [facility] BI-RADS 2  Benign findings scattered areas                fibroglandular density; 255 to 50% glandular dense breast               tissue  No date: MOHS SURGERY  No date: DE CONIZATION CERVIX W/WO D&C RPR KNIFE/LASER  No date: TONSILLECTOMY  No date: TUBAL LIGATION  No date: UPPER GASTROINTESTINAL ENDOSCOPY     Social History     Tobacco Use   • Smoking status: Former     Packs/day: 0 00     Years: 10 00     Total pack years: 0 00     Types: Cigarettes     Start date: 1965     Quit date: 1975     Years since quittin 4   • Smokeless tobacco: Never   • Tobacco comments:     Very light smoker   Vaping Use   • Vaping Use: Never used   Substance Use Topics   • Alcohol use:  Yes     Alcohol/week: 16 0 - 25 0 standard drinks of alcohol     Types: 14 - 21 Glasses of wine, 2 - 4 Cans of beer per week   • Drug use: Yes     Types: Marijuana     Comment: occasional edibles          Current Outpatient Medications:   •  Ascorbic Acid (vitamin C) 1000 MG tablet, Take 1,000 mg by mouth daily, Disp: , Rfl:   •  Calcium Carbonate+Vitamin D 600-200 MG-UNIT TABS, Take 1 tablet by mouth every 24 hours, Disp: , Rfl:   •  cholecalciferol (VITAMIN D3) 1,000 units tablet, Take 2,000 Units by mouth daily, Disp: , Rfl:   •  fish oil-omega-3 fatty acids 1000 MG capsule, Take 1 capsule by mouth daily, Disp: , Rfl:   •  IODINE EX, Apply topically daily, Disp: , Rfl:   •  Multiple Vitamins-Minerals (CENTRUM SILVER 50+WOMEN PO), Take 1 tablet by mouth every 24 hours, Disp: , Rfl:   •  multivitamin (THERAGRAN) TABS, Take 1 tablet by mouth daily  , Disp: , Rfl:   •  omeprazole (PriLOSEC) 20 mg delayed release capsule, Take 1 capsule (20 mg total) by mouth daily, Disp: 90 capsule, Rfl: 3  •  Red Yeast Rice 600 MG CAPS, , Disp: , Rfl:   •  Red Yeast Rice Extract (RED YEAST RICE PO), Take by mouth 2 (two) times a day  , Disp: , Rfl:   •  vitamin E 600 UNIT capsule, Take 1 capsule (600 Units total) by mouth daily, Disp: 30 capsule, Rfl: 11  •  vitamin k 100 MCG tablet, Take 1 tablet by mouth every 24 hours, Disp: , Rfl:   •  nitrofurantoin (MACROBID) 100 mg capsule, Take 1 capsule (100 mg total) by mouth 2 (two) times a day, Disp: 14 capsule, Rfl: 0    She has No Known Allergies       ROS: Review of Systems   Constitutional: Negative  Gastrointestinal: Negative  Genitourinary: Negative  Negative for pelvic pain, urgency, vaginal bleeding and vaginal discharge  Notes sometimes bulge in front of pessary   Psychiatric/Behavioral: Negative  Objective:  /68 (BP Location: Right arm, Patient Position: Sitting, Cuff Size: Standard)   Wt 77 1 kg (170 lb)   BMI 28 29 kg/m²      Physical Exam  Constitutional:       Appearance: Normal appearance  Genitourinary:     General: Normal vulva  Exam position: Lithotomy position  Vagina: Prolapsed vaginal walls present  No vaginal discharge, tenderness or lesions  Cervix: Normal       Uterus: Normal  Not enlarged and not tender  Adnexa:         Right: No mass or tenderness  Left: No mass or tenderness  Rectum: No external hemorrhoid  Comments: Initial exam with pessary in place - with valsalva pessary in correct position and no cystocele past pessary but stage 2 rectocele pushes past pessary  Pessary removed and vagina normal on spec exam   With pessary out - with valsalva stage 1 cystocele and stage 2 rectocele  Pessary replaced at end of exam   Neurological:      Mental Status: She is alert     Psychiatric:         Mood and Affect: Mood normal          Behavior: Behavior normal              Pessary    Date/Time: 5/31/2023 9:40 AM    Performed by: Aida Morris MD  Authorized by: Fannie Garrido MD  Universal Protocol:  Consent: Verbal consent obtained  Risks and benefits: risks, benefits and alternatives were discussed  Consent given by: patient  Patient understanding: patient states understanding of the procedure being performed  Patient identity confirmed: verbally with patient      Indication:     Indication for pessary: cystocele    Pre-procedure:     Pessary procedure type:  Cleaning/check  Problems:     Pessary complications comment:  Feeling tissue past pessary  Procedure:     Pessary type:  Ring w/ support    Pessary size:  5    Patient tolerance of procedure:   Tolerated well, no immediate complications  Comments:     Procedure comments:  Pessary removed, cleaned and replaced w/o difficulty

## 2023-05-31 ENCOUNTER — OFFICE VISIT (OUTPATIENT)
Dept: OBGYN CLINIC | Facility: CLINIC | Age: 68
End: 2023-05-31

## 2023-05-31 VITALS — BODY MASS INDEX: 28.29 KG/M2 | DIASTOLIC BLOOD PRESSURE: 68 MMHG | WEIGHT: 170 LBS | SYSTOLIC BLOOD PRESSURE: 110 MMHG

## 2023-05-31 DIAGNOSIS — N81.6 RECTOCELE: ICD-10-CM

## 2023-05-31 DIAGNOSIS — Z96.0 PRESENCE OF PESSARY: ICD-10-CM

## 2023-05-31 DIAGNOSIS — N81.11 MIDLINE CYSTOCELE: Primary | ICD-10-CM

## 2023-05-31 NOTE — LETTER
May 31, 2023       No Recipients    Patient: Brant Pardo   YOB: 1955   Date of Visit: 5/31/2023       Dear Dr Joaquin Holstein Recipients: Thank you for referring Luzma Bender to me for evaluation  Below are my notes for this consultation  If you have questions, please do not hesitate to call me  I look forward to following your patient along with you  Sincerely,        Adia Morris MD        CC:   No Recipients    Aida Morris MD  5/31/2023 12:34 PM  Sign when Signing Visit  [address], [address] [address]    Assessment/Plan:  1  Midline cystocele  Assessment & Plan:  On exam it appears current pessary is correcting cystocele but the rectocele pushes past it on valsalva  Reviewed pelvic prolapse with patient, different types and showed pictures to demonstrate  I reviewed that it appears the pessary is adequately treating the cystocele but pessaries often do not work well for rectoceles  Discussed that a different type of pessary maybe be more effective, but in my experience this is not really the case  She is not bothered by the rectocele - no pain, doesn't interfere with activities  She admits the pessary has helped her cystocele  Discussed that if she feels pessary is helping and is not bothered by the mild rectocele, she can continue to use it  If she is bothered by the rectocele she could discuss with Dr Mila Peralta if she feels a different pessary would work better or she could consider surgery, which Dr Mila Peralta can also provide  She wishes to continue pessary for now and is comfortable cleaning and replacing it  I discussed generally is she can manage cleaning of pessary, Dr Mila Peralta would only need to follow up annual, unless Shabbir Singleton has an issue  I will confirm this with Dr Mila Singleton will continue the pessary for now with self management and make an appointment if she develops further problems        2  "Rectocele    3  Presence of pessary      I have spent a total time of 30 minutes on 23 in caring for this patient including Instructions for management, Patient education, Impressions, Counseling / Coordination of care, Documenting in the medical record, Reviewing / ordering tests, medicine, procedures  , Obtaining or reviewing history and exam       Subjective:   Amy Fontanez is a 69 y o  [address] female  CC: \"I feel a bulge in front of pessary\"      HPI:   Kate Yen presents for pessary evaluation  She is a patient of Dr Riaz Louis with a long history of pelvic floor prolapse  In March she was fitted and had a #5 ring pessary with support placed as prolapse symptoms had increased  Per Dr Nava Betts notes she had a \"grade 3 cystocele and grade 2 rectocele with valsalva\"  Chely was scheduled for follow up with Dr Riaz Louis next month but called last week with c/o feeling a bulge near pessary, and was scheduled for exam today  She states she needed to cancel the  follow up anyway due to scheduling conflict  She reports she is able to remove and clean pessary and does so about weekly  She is not using any vaginal creams currently but does use some lubrication with reinsertion  She states she was doing fine but first noted a couple weeks ago a \"bulge in front of pessary\"  It's not always there  No pain or bleeding  No real change in her normal vaginal discharge  Overall has felt comfortable with pessary but is wondering what this is  Gyn History  No LMP recorded  Patient is postmenopausal        Last pap smear: 2023    She  reports being sexually active and has had partner(s) who are male  She reports using the following methods of birth control/protection: Post-menopausal and Female Sterilization         OB History      Past Medical History:  No date: Abnormal Pap smear of cervix  : Torrez esophagus  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  No date: Breast cancer ([facility] )  2009: Cancer " [facility])      Comment:  left breast  No date: Cholelithiasis  No date: Colon polyp  No date: Fatty liver  Early : Female infertility  No date: GERD (gastroesophageal reflux disease)  No date: Hemorrhoids  : Hypothyroidism  No date: Miscarriage      Comment:  2  No date: Osteopenia  s: Polycystic ovary syndrome  2017?: Pulmonary arterial hypertension ([address] )      Comment:  Exercise induced  Childhood: Varicella     Past Surgical History:  2009: BREAST BIOPSY  No date: BREAST LUMPECTOMY  No date: CHOLECYSTECTOMY  2021: COLONOSCOPY      Comment:  [facility]  No date: COLONOSCOPY  No date: DILATION AND CURETTAGE OF UTERUS      Comment:  X2  2020: DXA PROCEDURE (HISTORICAL)      Comment:  Osteopenia  2022: MAMMO (HISTORICAL); Bilateral      Comment:  [facility] BI-RADS 2  Benign findings scattered areas                fibroglandular density; 255 to 50% glandular dense breast               tissue  No date: MOHS SURGERY  No date: IN CONIZATION CERVIX W/WO D&C RPR KNIFE/LASER  No date: TONSILLECTOMY  No date: TUBAL LIGATION  No date: UPPER GASTROINTESTINAL ENDOSCOPY     Social History     Tobacco Use   • Smoking status: Former     Packs/day: 0 00     Years: 10 00     Total pack years: 0 00     Types: Cigarettes     Start date: 1965     Quit date: 1975     Years since quittin 4   • Smokeless tobacco: Never   • Tobacco comments:     Very light smoker   Vaping Use   • Vaping Use: Never used   Substance Use Topics   • Alcohol use:  Yes     Alcohol/week: 16 0 - 25 0 standard drinks of alcohol     Types: 14 - 21 Glasses of wine, 2 - 4 Cans of beer per week   • Drug use: Yes     Types: Marijuana     Comment: occasional edibles          Current Outpatient Medications:   •  Ascorbic Acid (vitamin C) 1000 MG tablet, Take 1,000 mg by mouth daily, Disp: , Rfl:   •  Calcium Carbonate+Vitamin D 600-200 MG-UNIT TABS, Take 1 tablet by mouth every 24 hours, Disp: , Rfl:   •  cholecalciferol (VITAMIN D3) 1,000 units tablet, Take 2,000 Units by mouth daily, Disp: , Rfl:   •  fish oil-omega-3 fatty acids 1000 MG capsule, Take 1 capsule by mouth daily, Disp: , Rfl:   •  IODINE EX, Apply topically daily, Disp: , Rfl:   •  Multiple Vitamins-Minerals (CENTRUM SILVER 50+WOMEN PO), Take 1 tablet by mouth every 24 hours, Disp: , Rfl:   •  multivitamin (THERAGRAN) TABS, Take 1 tablet by mouth daily  , Disp: , Rfl:   •  omeprazole (PriLOSEC) 20 mg delayed release capsule, Take 1 capsule (20 mg total) by mouth daily, Disp: 90 capsule, Rfl: 3  •  Red Yeast Rice 600 MG CAPS, , Disp: , Rfl:   •  Red Yeast Rice Extract (RED YEAST RICE PO), Take by mouth 2 (two) times a day  , Disp: , Rfl:   •  vitamin E 600 UNIT capsule, Take 1 capsule (600 Units total) by mouth daily, Disp: 30 capsule, Rfl: 11  •  vitamin k 100 MCG tablet, Take 1 tablet by mouth every 24 hours, Disp: , Rfl:   •  nitrofurantoin (MACROBID) 100 mg capsule, Take 1 capsule (100 mg total) by mouth 2 (two) times a day, Disp: 14 capsule, Rfl: 0    She has No Known Allergies       ROS: Review of Systems   Constitutional: Negative  Gastrointestinal: Negative  Genitourinary: Negative  Negative for pelvic pain, urgency, vaginal bleeding and vaginal discharge  Notes sometimes bulge in front of pessary   Psychiatric/Behavioral: Negative  Objective:  /68 (BP Location: Right arm, Patient Position: Sitting, Cuff Size: Standard)   Wt 77 1 kg (170 lb)   BMI 28 29 kg/m²     Physical Exam  Constitutional:       Appearance: Normal appearance  Genitourinary:     General: Normal vulva  Exam position: Lithotomy position  Vagina: Prolapsed vaginal walls present  No vaginal discharge, tenderness or lesions  Cervix: Normal       Uterus: Normal  Not enlarged and not tender  Adnexa:         Right: No mass or tenderness  Left: No mass or tenderness  Rectum: No external hemorrhoid        Comments: Initial exam with pessary in place - with valsalva pessary in correct position and no cystocele past pessary but stage 2 rectocele pushes past pessary  Pessary removed and vagina normal on spec exam   With pessary out - with valsalva stage 1 cystocele and stage 2 rectocele  Pessary replaced at end of exam   Neurological:      Mental Status: She is alert  Psychiatric:         Mood and Affect: Mood normal          Behavior: Behavior normal             Pessary    Date/Time: 5/31/2023 9:40 AM    Performed by: Harmeet Pimentel MD  Authorized by: Harmeet Pimentel MD  Universal Protocol:  Consent: Verbal consent obtained  Risks and benefits: risks, benefits and alternatives were discussed  Consent given by: patient  Patient understanding: patient states understanding of the procedure being performed  Patient identity confirmed: verbally with patient      Indication:     Indication for pessary: cystocele    Pre-procedure:     Pessary procedure type:  Cleaning/check  Problems:     Pessary complications comment:  Feeling tissue past pessary  Procedure:     Pessary type:  Ring w/ support    Pessary size:  5    Patient tolerance of procedure:   Tolerated well, no immediate complications  Comments:     Procedure comments:  Pessary removed, cleaned and replaced w/o difficulty

## 2023-05-31 NOTE — ASSESSMENT & PLAN NOTE
On exam it appears current pessary is correcting cystocele but the rectocele pushes past it on valsalva  Reviewed pelvic prolapse with patient, different types and showed pictures to demonstrate  I reviewed that it appears the pessary is adequately treating the cystocele but pessaries often do not work well for rectoceles  Discussed that a different type of pessary maybe be more effective, but in my experience this is not really the case  She is not bothered by the rectocele - no pain, doesn't interfere with activities  She admits the pessary has helped her cystocele  Discussed that if she feels pessary is helping and is not bothered by the mild rectocele, she can continue to use it  If she is bothered by the rectocele she could discuss with Dr Elida Parra if she feels a different pessary would work better or she could consider surgery, which Dr Elida Parra can also provide  She wishes to continue pessary for now and is comfortable cleaning and replacing it  I discussed generally is she can manage cleaning of pessary, Dr Elida Parra would only need to follow up annual, unless Indra Quezada has an issue  I will confirm this with Dr Elida Quezada will continue the pessary for now with self management and make an appointment if she develops further problems

## 2023-09-27 ENCOUNTER — OFFICE VISIT (OUTPATIENT)
Dept: GASTROENTEROLOGY | Facility: CLINIC | Age: 68
End: 2023-09-27
Payer: MEDICARE

## 2023-09-27 VITALS
BODY MASS INDEX: 28 KG/M2 | DIASTOLIC BLOOD PRESSURE: 78 MMHG | WEIGHT: 174.2 LBS | SYSTOLIC BLOOD PRESSURE: 138 MMHG | HEIGHT: 66 IN

## 2023-09-27 DIAGNOSIS — Z86.010 HISTORY OF COLON POLYPS: ICD-10-CM

## 2023-09-27 DIAGNOSIS — K22.70 BARRETT'S ESOPHAGUS WITHOUT DYSPLASIA: ICD-10-CM

## 2023-09-27 DIAGNOSIS — K76.0 FATTY LIVER: Primary | ICD-10-CM

## 2023-09-27 PROCEDURE — 99214 OFFICE O/P EST MOD 30 MIN: CPT | Performed by: INTERNAL MEDICINE

## 2023-09-27 RX ORDER — OMEPRAZOLE 20 MG/1
20 CAPSULE, DELAYED RELEASE ORAL DAILY
Qty: 90 CAPSULE | Refills: 4 | Status: SHIPPED | OUTPATIENT
Start: 2023-09-27

## 2023-09-27 NOTE — PROGRESS NOTES
[facility] - Outpatient Follow-up Note  Heriberto Pardo 77 y.o. female MRN: [id number]  Encounter: [id number]    ASSESSMENT AND PLAN:      1. Fatty liver  No issues. Still drinks wine daily. LFTs have been normal in recent years. - vitamin E, tocopherol, 200 units capsule; Take 3 capsules (600 Units total) by mouth daily  Dispense: 90 capsule; Refill: 11  - Comprehensive metabolic panel  - US elastography; Future    2. Torrez's esophagus without dysplasia  Recall EGD 7/2024.    - Discussed risks and benefits of long term PPI therapy as per current literature. - omeprazole (PriLOSEC) 20 mg delayed release capsule; Take 1 capsule (20 mg total) by mouth daily  Dispense: 90 capsule; Refill: 4  - CBC and differential; Future  - CBC and differential  - EGD; Future    3. History of colon polyps  Recall 7/2026      Followup Appointment: 1 year  ______________________________________________________________________    Chief Complaint   Patient presents with   • Follow-up     HPI: This is a 78-year-old female here today for follow-up. Doing well. No issues. Still drinks wine on a regular basis. Recent LFTs have been otherwise unremarkable. Due for blood work in December. No significant issues with reflux, remains on low-dose omeprazole. Historical Information   Past Medical History:   Diagnosis Date   • Abnormal Pap smear of cervix    • Arthritis    • Torrez esophagus 2021   • Torrez's esophagus    • Breast cancer ([address])    • Cancer ([address]) 2009    left breast   • Cholelithiasis    • Colon polyp    • Cystocele with rectocele     now has pessary   • Fatty liver    • Female infertility Early 1980's   • GERD (gastroesophageal reflux disease)    • Hemorrhoids    • Hypothyroidism 1970's   • Miscarriage     2   • Osteopenia    • Polycystic ovary syndrome 1970's   • Presence of pessary    • Pulmonary arterial hypertension ([address]) 2017?     Exercise induced   • Varicella Childhood     Past Surgical History:   Procedure Laterality Date   • BREAST BIOPSY     • BREAST LUMPECTOMY     • CHOLECYSTECTOMY     • COLONOSCOPY  2021    [facility]   • COLONOSCOPY     • DILATION AND CURETTAGE OF UTERUS      X2   • DXA PROCEDURE (HISTORICAL)  2020    Osteopenia   • MAMMO (HISTORICAL) Bilateral 2022    [facility] BI-RADS 2.  Benign findings scattered areas fibroglandular density; 255 to 50% glandular dense breast tissue   • MOHS SURGERY     • IA CONIZATION CERVIX W/WO D&C RPR KNIFE/LASER     • TONSILLECTOMY     • TUBAL LIGATION     • UPPER GASTROINTESTINAL ENDOSCOPY       Social History     Substance and Sexual Activity   Alcohol Use Yes   • Alcohol/week: 16.0 - 25.0 standard drinks of alcohol   • Types: 14 - 21 Glasses of wine, 2 - 4 Cans of beer per week     Social History     Substance and Sexual Activity   Drug Use Yes   • Types: Marijuana    Comment: occasional edibles     Social History     Tobacco Use   Smoking Status Former   • Packs/day: 0.00   • Years: 10.00   • Total pack years: 0.00   • Types: Cigarettes   • Start date: 1965   • Quit date: 1975   • Years since quittin.7   Smokeless Tobacco Never   Tobacco Comments    Very light smoker     Family History   Problem Relation Age of Onset   • Stroke Mother    • Cancer Mother         skin   • Cancer Father         liver, stomach, prostate, skin   • Stomach cancer Father    • Cancer Maternal Aunt         poss fallopian tube   • Ovarian cancer Cousin    • Colon cancer Neg Hx    • Colon polyps Neg Hx    • Breast cancer Neg Hx    • Uterine cancer Neg Hx          Current Outpatient Medications:   •  Ascorbic Acid (vitamin C) 1000 MG tablet  •  Calcium Carbonate+Vitamin D 600-200 MG-UNIT TABS  •  cholecalciferol (VITAMIN D3) 1,000 units tablet  •  fish oil-omega-3 fatty acids 1000 MG capsule  •  IODINE EX  •  Multiple Vitamins-Minerals (CENTRUM SILVER 50+WOMEN PO)  •  omeprazole (PriLOSEC) 20 mg delayed release capsule  •  Red Yeast Rice 600 MG CAPS  •  vitamin E, tocopherol, 200 units capsule  •  vitamin k 100 MCG tablet  •  multivitamin (THERAGRAN) TABS  •  nitrofurantoin (MACROBID) 100 mg capsule  •  Red Yeast Rice Extract (RED YEAST RICE PO)  No Known Allergies  Reviewed medications and allergies and updated as indicated    PHYSICAL EXAM:    Blood pressure 138/78, height 5' 6" (1.676 m), weight 79 kg (174 lb 3.2 oz), not currently breastfeeding. Body mass index is 28.12 kg/m². General Appearance: NAD, cooperative, alert  Eyes: Anicteric, PERRLA, EOMI  ENT:  Normocephalic, atraumatic, normal mucosa. Neck:  Supple, symmetrical, trachea midline  Resp:  Clear to auscultation bilaterally; no rales, rhonchi or wheezing; respirations unlabored   CV:  S1 S2, Regular rate and rhythm; no murmur, rub, or gallop. GI:  Soft, non-tender, non-distended; normal bowel sounds; no masses, no organomegaly   Rectal: Deferred  Musculoskeletal: No cyanosis, clubbing or edema. Normal ROM. Skin:  No jaundice, rashes, or lesions   Heme/Lymph: No palpable cervical lymphadenopathy  Psych: Normal affect, good eye contact  Neuro: No gross deficits, AAOx3    Lab Results:   Lab Results   Component Value Date    WBC 3.4 12/01/2022    HGB 13.8 12/01/2022    HCT 40.2 12/01/2022    MCV 95 12/01/2022     12/01/2022     Lab Results   Component Value Date    K 4.7 12/01/2022     12/01/2022    CO2 24 12/01/2022    BUN 14 12/01/2022    CREATININE 0.78 12/01/2022    AST 29 12/01/2022    ALT 19 12/01/2022    EGFR 82 12/01/2022     No results found for: "IRON", "TIBC", "FERRITIN"  No results found for: "LIPASE"    Radiology Results:   No results found.

## 2023-10-18 ENCOUNTER — HOSPITAL ENCOUNTER (OUTPATIENT)
Dept: ULTRASOUND IMAGING | Facility: HOSPITAL | Age: 68
Discharge: HOME | End: 2023-10-18
Attending: INTERNAL MEDICINE
Payer: MEDICARE

## 2023-10-18 DIAGNOSIS — K76.0 FATTY LIVER: ICD-10-CM

## 2023-10-18 PROCEDURE — 76981 USE PARENCHYMA: CPT

## 2023-12-06 LAB
ALBUMIN SERPL-MCNC: 4.7 G/DL (ref 3.9–4.9)
ALBUMIN/GLOB SERPL: 2 {RATIO} (ref 1.2–2.2)
ALP SERPL-CCNC: 70 IU/L (ref 44–121)
ALT SERPL-CCNC: 14 IU/L (ref 0–32)
AST SERPL-CCNC: 24 IU/L (ref 0–40)
BASOPHILS # BLD AUTO: 0.1 X10E3/UL (ref 0–0.2)
BASOPHILS NFR BLD AUTO: 1 %
BILIRUB SERPL-MCNC: 0.5 MG/DL (ref 0–1.2)
BUN SERPL-MCNC: 15 MG/DL (ref 8–27)
BUN/CREAT SERPL: 21 (ref 12–28)
CALCIUM SERPL-MCNC: 9.9 MG/DL (ref 8.7–10.3)
CHLORIDE SERPL-SCNC: 107 MMOL/L (ref 96–106)
CO2 SERPL-SCNC: 19 MMOL/L (ref 20–29)
CREAT SERPL-MCNC: 0.71 MG/DL (ref 0.57–1)
EGFR: 91 ML/MIN/1.73
EOSINOPHIL # BLD AUTO: 0.2 X10E3/UL (ref 0–0.4)
EOSINOPHIL NFR BLD AUTO: 3 %
ERYTHROCYTE [DISTWIDTH] IN BLOOD BY AUTOMATED COUNT: 11.6 % (ref 11.7–15.4)
GLOBULIN SER-MCNC: 2.3 G/DL (ref 1.5–4.5)
GLUCOSE SERPL-MCNC: 94 MG/DL (ref 70–99)
HCT VFR BLD AUTO: 42.4 % (ref 34–46.6)
HGB BLD-MCNC: 14.1 G/DL (ref 11.1–15.9)
IMM GRANULOCYTES # BLD: 0 X10E3/UL (ref 0–0.1)
IMM GRANULOCYTES NFR BLD: 0 %
LYMPHOCYTES # BLD AUTO: 1.8 X10E3/UL (ref 0.7–3.1)
LYMPHOCYTES NFR BLD AUTO: 30 %
MCH RBC QN AUTO: 31.9 PG (ref 26.6–33)
MCHC RBC AUTO-ENTMCNC: 33.3 G/DL (ref 31.5–35.7)
MCV RBC AUTO: 96 FL (ref 79–97)
MONOCYTES # BLD AUTO: 0.5 X10E3/UL (ref 0.1–0.9)
MONOCYTES NFR BLD AUTO: 9 %
NEUTROPHILS # BLD AUTO: 3.3 X10E3/UL (ref 1.4–7)
NEUTROPHILS NFR BLD AUTO: 57 %
PLATELET # BLD AUTO: 387 X10E3/UL (ref 150–450)
POTASSIUM SERPL-SCNC: 4.7 MMOL/L (ref 3.5–5.2)
PROT SERPL-MCNC: 7 G/DL (ref 6–8.5)
RBC # BLD AUTO: 4.42 X10E6/UL (ref 3.77–5.28)
SODIUM SERPL-SCNC: 145 MMOL/L (ref 134–144)
WBC # BLD AUTO: 5.8 X10E3/UL (ref 3.4–10.8)

## 2024-02-05 ENCOUNTER — TELEPHONE (OUTPATIENT)
Age: 69
End: 2024-02-05

## 2024-02-05 NOTE — TELEPHONE ENCOUNTER
OA Questions for EGD  Date: [02/05/2024  ]  Screened by: [stephen liz  ]     Referring Provider: [  ]     Pre-Screening: BMI [  ]    Past EGD? If yes - Date: [  2021 ]  Physician/Facility: [  Dr Rudolph bocanegra ]  Reason: [ [place]  ]     SCHEDULING STAFF: If the patient is over 75 years old, please schedule an office visit.  ·      Does the patient want to see a gastroenterologist prior to their procedure to discuss any GI symptoms? no  ·      Has the patient been hospitalized or had abdominal surgery in the past 6 months?no  ·      Does the patient use supplemental oxygen?no  ·      Does the patient take [Coumadin], [Lovenox], [Plavix], [Eliquis], [Xarelto], or other blood thinning medication?  [No]  ·      Has the patient had a stroke, cardiac event, or stent placed in the past year? [No]     SCHEDULING STAFF: If patient answers NO to the above questions, then schedule the procedure. If patient answers YES to any of the above questions, then schedule an office appointment.  ·       If a repeat EGD is belated and patient declines procedure à notify provider.

## 2024-02-05 NOTE — TELEPHONE ENCOUNTER
ASC Screening    ASC Screening  BMI > than 45: No  Are you currently pregnant?: No  Do you rely on a wheelchair for mobility?: No  Do you need oxygen during the day?: No  Have you ever been informed by anesthesia that you have a difficult airway?: No  Have you been diagnosed with End Stage Renal Disease (ESRD)?: No  Are you actively on dialysis?: No  Have you been diagnosed with Pulmonary Hypertension?: Yes  Do you have a pacemaker or an Automatic Implantable Cardioverter Defibrillator (AICD)?: No  Have you ever had an organ transplant?: No  Have you had a stroke, heart attack, myocardial infarction (MI) within the last 6 months?: No  Have you ever been diagnosed with Aortic Stenosis?: No  Have you ever been diagnosed  with Congestive Heart Failure?: No  Have you ever been diagnosed with a heart valve disease?: No  Are you Diabetic?: No  If you are Diabetic, has your A1C been greater than 12 within the last six months?: N/A

## 2024-02-05 NOTE — TELEPHONE ENCOUNTER
Scheduled date of EGD(as of today):07/11/2024  Physician performing EGD:DR Galdamez  Location of EGD:[facility]  Instructions reviewed with patient by: pt requested to be mail to her   Clearances: fyi patient stated when she exercices she has pulmonary hypertension , she stated last time she had colonoscopy and egd was at  [facility] please review if its okay for her to be at the endoscopy center and let her know thank you

## 2024-02-06 ENCOUNTER — TELEPHONE (OUTPATIENT)
Dept: OBGYN CLINIC | Facility: CLINIC | Age: 69
End: 2024-02-06

## 2024-02-06 DIAGNOSIS — Z12.31 ENCOUNTER FOR SCREENING MAMMOGRAM FOR MALIGNANT NEOPLASM OF BREAST: Primary | ICD-10-CM

## 2024-02-06 NOTE — TELEPHONE ENCOUNTER
Chely steven requesting mammogram order prior to her upcoming appointment on 2/22/2024. Per epic, prior  mammogram done 2/13/2023. Order generated, in  box in [place] office. (Patient needs notification)

## 2024-02-12 DIAGNOSIS — Z12.31 ENCOUNTER FOR SCREENING MAMMOGRAM FOR MALIGNANT NEOPLASM OF BREAST: ICD-10-CM

## 2024-02-21 PROBLEM — Z01.419 ENCOUNTER FOR GYNECOLOGICAL EXAMINATION (GENERAL) (ROUTINE) WITHOUT ABNORMAL FINDINGS: Status: RESOLVED | Noted: 2023-02-14 | Resolved: 2024-02-21

## 2024-02-22 ENCOUNTER — ANNUAL EXAM (OUTPATIENT)
Dept: OBGYN CLINIC | Facility: CLINIC | Age: 69
End: 2024-02-22
Payer: MEDICARE

## 2024-02-22 VITALS
HEIGHT: 65 IN | SYSTOLIC BLOOD PRESSURE: 120 MMHG | BODY MASS INDEX: 27.99 KG/M2 | WEIGHT: 168 LBS | DIASTOLIC BLOOD PRESSURE: 76 MMHG

## 2024-02-22 DIAGNOSIS — M85.852 OSTEOPENIA OF LEFT FEMORAL NECK: ICD-10-CM

## 2024-02-22 DIAGNOSIS — N81.11 MIDLINE CYSTOCELE: Primary | ICD-10-CM

## 2024-02-22 DIAGNOSIS — Z87.42 HISTORY OF ABNORMAL CERVICAL PAP SMEAR: ICD-10-CM

## 2024-02-22 DIAGNOSIS — Z12.31 ENCOUNTER FOR SCREENING MAMMOGRAM FOR MALIGNANT NEOPLASM OF BREAST: ICD-10-CM

## 2024-02-22 DIAGNOSIS — Z85.3 PERSONAL HISTORY OF BREAST CANCER: ICD-10-CM

## 2024-02-22 PROBLEM — K62.5 RECTAL BLEEDING: Status: RESOLVED | Noted: 2019-10-30 | Resolved: 2024-02-22

## 2024-02-22 PROCEDURE — 99214 OFFICE O/P EST MOD 30 MIN: CPT | Performed by: OBSTETRICS & GYNECOLOGY

## 2024-02-22 RX ORDER — OXYQUINOLINE/BORIC ACID 0.025 %
1 JELLY WITH APPLICATOR (GRAM) VAGINAL WEEKLY
Qty: 113.4 G | Refills: 3 | Status: SHIPPED | OUTPATIENT
Start: 2024-02-22

## 2024-02-22 NOTE — PATIENT INSTRUCTIONS
Return to office in one year unless having any problems such as breast changes, bleeding, new persistent pain, new progressive bloating, new problems eating (getting full to quickly) or new constant urinary pressure that does not resolve in one week.    Continue to strive for 1200 mg of calcium and 1000 IU of vitamin D daily in diet and supplements combined for your bone health.  You can only absorb 600 mg of calcium at a time. Avoid excess calcium as this may adversely effect your heart.  There are 400 mg of calcium in an 8 oz serving of dairy.

## 2024-02-22 NOTE — PROGRESS NOTES
Assessment/Plan:      Diagnoses and all orders for this visit:    Personal history of breast cancer - left    Midline cystocele    Osteopenia of left femoral neck    History of abnormal cervical Pap smear - LEEP for LGSIL 2013    Encounter for screening mammogram for malignant neoplasm of breast          Subjective:     Patient ID: Luz Pardo is a 68 y.o. female.    HPI    Review of Systems      Objective:     Physical Exam

## 2024-02-22 NOTE — ASSESSMENT & PLAN NOTE
Discussed dexa from last year (Dexa 4/2023 - Spine T-0.7, hip T-1.7, fem neck T-2.3. Ten year fracture risk 4%) and offered medication if interested for elevated fracture risk.   Has been on fosamax years ago, declines at this time.   Calcium recs reviewed.

## 2024-02-22 NOTE — ASSESSMENT & PLAN NOTE
Stable with current pessary. Notes occ discharge, no arnoldo bleeding.   Pessary cleaned and replaced.   Trimosan rx given for weekly use if needed.

## 2024-03-19 DIAGNOSIS — K22.70 BARRETT'S ESOPHAGUS WITHOUT DYSPLASIA: ICD-10-CM

## 2024-03-19 RX ORDER — OMEPRAZOLE 20 MG/1
20 CAPSULE, DELAYED RELEASE ORAL DAILY
Qty: 90 CAPSULE | Refills: 1 | Status: SHIPPED | OUTPATIENT
Start: 2024-03-19

## 2024-03-19 NOTE — TELEPHONE ENCOUNTER
Reason for call:   [x] Refill   [] Prior Auth  [] Other:     Office:   [] PCP/Provider -   [x] Specialty/Provider -     Cinda Galdamez MD  Gastroenterology       Medication: omeprazole (PriLOSEC) 20 mg delayed release capsule - one daily # 90      Pharmacy: [facility] - [address]     Does the patient have enough for 3 days?   [x] Yes   [] No - Send as HP to POD

## 2024-06-24 ENCOUNTER — TELEPHONE (OUTPATIENT)
Dept: GASTROENTEROLOGY | Facility: CLINIC | Age: 69
End: 2024-06-24

## 2024-06-27 ENCOUNTER — ANESTHESIA EVENT (OUTPATIENT)
Dept: ANESTHESIOLOGY | Facility: AMBULATORY SURGERY CENTER | Age: 69
End: 2024-06-27

## 2024-06-27 ENCOUNTER — ANESTHESIA (OUTPATIENT)
Dept: ANESTHESIOLOGY | Facility: AMBULATORY SURGERY CENTER | Age: 69
End: 2024-06-27

## 2024-07-11 ENCOUNTER — ANESTHESIA (OUTPATIENT)
Dept: GASTROENTEROLOGY | Facility: AMBULATORY SURGERY CENTER | Age: 69
End: 2024-07-11

## 2024-07-11 ENCOUNTER — ANESTHESIA EVENT (OUTPATIENT)
Dept: GASTROENTEROLOGY | Facility: AMBULATORY SURGERY CENTER | Age: 69
End: 2024-07-11

## 2024-07-11 ENCOUNTER — HOSPITAL ENCOUNTER (OUTPATIENT)
Dept: GASTROENTEROLOGY | Facility: AMBULATORY SURGERY CENTER | Age: 69
Discharge: HOME | End: 2024-07-11
Attending: INTERNAL MEDICINE
Payer: MEDICARE

## 2024-07-11 VITALS
SYSTOLIC BLOOD PRESSURE: 132 MMHG | RESPIRATION RATE: 16 BRPM | HEART RATE: 71 BPM | TEMPERATURE: 98.3 F | BODY MASS INDEX: 27.99 KG/M2 | WEIGHT: 168 LBS | DIASTOLIC BLOOD PRESSURE: 65 MMHG | HEIGHT: 65 IN | OXYGEN SATURATION: 96 %

## 2024-07-11 DIAGNOSIS — K22.70 BARRETT'S ESOPHAGUS WITHOUT DYSPLASIA: ICD-10-CM

## 2024-07-11 PROCEDURE — 43239 EGD BIOPSY SINGLE/MULTIPLE: CPT | Performed by: INTERNAL MEDICINE

## 2024-07-11 PROCEDURE — 88305 TISSUE EXAM BY PATHOLOGIST: CPT | Performed by: STUDENT IN AN ORGANIZED HEALTH CARE EDUCATION/TRAINING PROGRAM

## 2024-07-11 RX ORDER — PROPOFOL 10 MG/ML
INJECTION, EMULSION INTRAVENOUS AS NEEDED
Status: DISCONTINUED | OUTPATIENT
Start: 2024-07-11 | End: 2024-07-11

## 2024-07-11 RX ORDER — SODIUM CHLORIDE, SODIUM LACTATE, POTASSIUM CHLORIDE, CALCIUM CHLORIDE 600; 310; 30; 20 MG/100ML; MG/100ML; MG/100ML; MG/100ML
50 INJECTION, SOLUTION INTRAVENOUS CONTINUOUS
Status: DISCONTINUED | OUTPATIENT
Start: 2024-07-11 | End: 2024-07-15 | Stop reason: HOSPADM

## 2024-07-11 RX ORDER — ROSUVASTATIN CALCIUM 5 MG/1
5 TABLET, COATED ORAL DAILY
COMMUNITY

## 2024-07-11 RX ADMIN — SODIUM CHLORIDE, SODIUM LACTATE, POTASSIUM CHLORIDE, CALCIUM CHLORIDE 50 ML/HR: 600; 310; 30; 20 INJECTION, SOLUTION INTRAVENOUS at 07:48

## 2024-07-11 RX ADMIN — PROPOFOL 100 MG: 10 INJECTION, EMULSION INTRAVENOUS at 08:05

## 2024-07-11 RX ADMIN — SODIUM CHLORIDE, SODIUM LACTATE, POTASSIUM CHLORIDE, CALCIUM CHLORIDE: 600; 310; 30; 20 INJECTION, SOLUTION INTRAVENOUS at 08:10

## 2024-07-11 RX ADMIN — PROPOFOL 50 MG: 10 INJECTION, EMULSION INTRAVENOUS at 08:10

## 2024-07-11 NOTE — ANESTHESIA POSTPROCEDURE EVALUATION
Post-Op Assessment Note    CV Status:  Stable  Pain Score: 0    Pain management: adequate       Mental Status:  Alert and awake   Hydration Status:  Euvolemic   PONV Controlled:  Controlled   Airway Patency:  Patent     Post Op Vitals Reviewed: Yes    No anethesia notable event occurred.    Staff: CRNA               BP   100/58   Temp   98   Pulse  84   Resp   16   SpO2   96

## 2024-07-11 NOTE — H&P
History and Physical - [facility]    Luz Pardo 68 y.o. female MRN: [id number]      HPI: Luz Pardo is a 68 y.o. female who presents for Torrez's esophagus.    No Known Allergies      REVIEW OF SYSTEMS: Per the HPI, and otherwise unremarkable.    Historical Information     Past Medical History:   Diagnosis Date    Abnormal Pap smear of cervix     Arthritis     Torrez esophagus 2021    Torrez's esophagus     Breast cancer (HCC)     Cancer (HCC) 2009    left breast    Cholelithiasis     Colon polyp     Cystocele with rectocele     now has pessary    Fatty liver     Female infertility Early 1980's    Fracture of 5th metatarsal 11/28/2023    GERD (gastroesophageal reflux disease)     Hemorrhoids     Hyperlipidemia     Hypothyroidism 1970's    Miscarriage     2    Osteopenia     Polycystic ovary syndrome 1970's    Presence of pessary     Pulmonary arterial hypertension (HCC) 2017?    Exercise induced    Varicella Childhood     Past Surgical History:   Procedure Laterality Date    BREAST BIOPSY  2009    BREAST LUMPECTOMY      CHOLECYSTECTOMY      COLONOSCOPY  07/2021    [facility]    COLONOSCOPY      DILATION AND CURETTAGE OF UTERUS      X2    DXA PROCEDURE (HISTORICAL)  12/2020    Osteopenia    MAMMO (HISTORICAL) Bilateral 01/11/2022    [facility] BI-RADS 2. Benign findings scattered areas fibroglandular density; 255 to 50% glandular dense breast tissue    MOHS SURGERY      NC CONIZATION CERVIX W/WO D&C RPR KNIFE/LASER      TONSILLECTOMY      TUBAL LIGATION      UPPER GASTROINTESTINAL ENDOSCOPY       Social History   Social History     Substance and Sexual Activity   Alcohol Use Yes    Alcohol/week: 16.0 - 25.0 standard drinks of alcohol    Types: 14 - 21 Glasses of wine, 2 - 4 Cans of beer per week     Social History     Substance and Sexual Activity   Drug Use Yes    Types: Marijuana    Comment: occasional edibles/last time within the last week     Social  "History     Tobacco Use   Smoking Status Former    Current packs/day: 0.00    Types: Cigarettes    Start date: 1965    Quit date: 1975    Years since quittin.5   Smokeless Tobacco Never   Tobacco Comments    Very light smoker     Family History   Problem Relation Age of Onset    Stroke Mother     Cancer Mother         skin    Cancer Father         liver, stomach, prostate, skin    Stomach cancer Father     Cancer Maternal Aunt         poss fallopian tube    Ovarian cancer Cousin     Colon cancer Neg Hx     Colon polyps Neg Hx     Breast cancer Neg Hx     Uterine cancer Neg Hx        Meds/Allergies       Current Outpatient Medications:     Ascorbic Acid (vitamin C) 1000 MG tablet    Calcium Carbonate+Vitamin D 600-200 MG-UNIT TABS    cholecalciferol (VITAMIN D3) 1,000 units tablet    fish oil-omega-3 fatty acids 1000 MG capsule    IODINE EX    Multiple Vitamins-Minerals (CENTRUM SILVER 50+WOMEN PO)    omeprazole (PriLOSEC) 20 mg delayed release capsule    OXYQUINOLONE SULFATE VAGINAL (Trimo-Guardado) 0.025 % GEL    Red Yeast Rice 600 MG CAPS    rosuvastatin (CRESTOR) 5 mg tablet    vitamin k 100 MCG tablet    vitamin E, tocopherol, 200 units capsule    Current Facility-Administered Medications:     lactated ringers infusion, 50 mL/hr, Intravenous, Continuous, 50 mL/hr at 24 0748        Objective     /61   Pulse 73   Temp 98.3 °F (36.8 °C) (Temporal)   Resp 20   Ht 5' 4.75\" (1.645 m)   Wt 76.2 kg (168 lb)   SpO2 95%   BMI 28.17 kg/m²       PHYSICAL EXAM    Gen: NAD AAOx3  Head: Normocephalic, Atraumatic  CV: S1S2 RRR no m/r/g  CHEST: Clear b/l no c/r/w  ABD: soft, +BS NT/ND no masses  EXT: no edema      ASSESSMENT/PLAN:  This is a 68 y.o. year old female here for EGD, and she is stable and optimized for her procedure.        "

## 2024-07-11 NOTE — ANESTHESIA PREPROCEDURE EVALUATION
Procedure:  EGD    Relevant Problems   GI/HEPATIC   (+) Fatty liver   (+) Liver cyst        Physical Exam    Airway    Mallampati score: I  TM Distance: >3 FB  Neck ROM: full     Dental   No notable dental hx     Cardiovascular  Cardiovascular exam normal    Pulmonary  Pulmonary exam normal     Other Findings  post-pubertal.      Anesthesia Plan  ASA Score- 2     Anesthesia Type- IV sedation with anesthesia with ASA Monitors.         Additional Monitors:     Airway Plan:     Comment: I discussed risks (reviewed with patient on the anesthesia consent form), benefits and alternatives of monitored sedation including the possibility under sedation to have recall or mild discomfort.  .       Plan Factors-    Chart reviewed.    Patient summary reviewed.                  Induction- intravenous.    Postoperative Plan-         Informed Consent- Anesthetic plan and risks discussed with patient.  I personally reviewed this patient with the CRNA. Discussed and agreed on the Anesthesia Plan with the CRNA..

## 2024-07-15 PROCEDURE — 88305 TISSUE EXAM BY PATHOLOGIST: CPT | Performed by: STUDENT IN AN ORGANIZED HEALTH CARE EDUCATION/TRAINING PROGRAM

## 2024-08-19 ENCOUNTER — OFFICE VISIT (OUTPATIENT)
Dept: URGENT CARE | Facility: CLINIC | Age: 69
End: 2024-08-19
Payer: MEDICARE

## 2024-08-19 VITALS
WEIGHT: 173.4 LBS | BODY MASS INDEX: 29.08 KG/M2 | RESPIRATION RATE: 18 BRPM | SYSTOLIC BLOOD PRESSURE: 122 MMHG | TEMPERATURE: 98.9 F | DIASTOLIC BLOOD PRESSURE: 70 MMHG | HEART RATE: 87 BPM | OXYGEN SATURATION: 99 %

## 2024-08-19 DIAGNOSIS — R30.9 PAINFUL URINATION: ICD-10-CM

## 2024-08-19 DIAGNOSIS — N30.01 ACUTE CYSTITIS WITH HEMATURIA: Primary | ICD-10-CM

## 2024-08-19 LAB
SL AMB  POCT GLUCOSE, UA: NEGATIVE
SL AMB LEUKOCYTE ESTERASE,UA: ABNORMAL
SL AMB POCT BILIRUBIN,UA: ABNORMAL
SL AMB POCT BLOOD,UA: ABNORMAL
SL AMB POCT CLARITY,UA: ABNORMAL
SL AMB POCT COLOR,UA: ABNORMAL
SL AMB POCT KETONES,UA: ABNORMAL
SL AMB POCT NITRITE,UA: POSITIVE
SL AMB POCT PH,UA: 6
SL AMB POCT SPECIFIC GRAVITY,UA: 1.02
SL AMB POCT URINE PROTEIN: 2000
SL AMB POCT UROBILINOGEN: 0.2

## 2024-08-19 PROCEDURE — 81002 URINALYSIS NONAUTO W/O SCOPE: CPT | Performed by: FAMILY MEDICINE

## 2024-08-19 PROCEDURE — G0463 HOSPITAL OUTPT CLINIC VISIT: HCPCS | Performed by: FAMILY MEDICINE

## 2024-08-19 PROCEDURE — 99213 OFFICE O/P EST LOW 20 MIN: CPT | Performed by: FAMILY MEDICINE

## 2024-08-19 RX ORDER — SULFAMETHOXAZOLE/TRIMETHOPRIM 800-160 MG
1 TABLET ORAL EVERY 12 HOURS SCHEDULED
Qty: 14 TABLET | Refills: 0 | Status: SHIPPED | OUTPATIENT
Start: 2024-08-19 | End: 2024-08-26

## 2024-08-19 RX ORDER — SULFAMETHOXAZOLE/TRIMETHOPRIM 800-160 MG
1 TABLET ORAL EVERY 12 HOURS SCHEDULED
Qty: 6 TABLET | Refills: 0 | Status: SHIPPED | OUTPATIENT
Start: 2024-08-19 | End: 2024-08-19 | Stop reason: CLARIF

## 2024-08-19 RX ORDER — PHENAZOPYRIDINE HYDROCHLORIDE 200 MG/1
200 TABLET, FILM COATED ORAL
Qty: 10 TABLET | Refills: 0 | Status: SHIPPED | OUTPATIENT
Start: 2024-08-19

## 2024-08-19 NOTE — PROGRESS NOTES
[facility]        NAME: Luz Pardo is a 69 y.o. female  : 1955    MRN: [id number]  DATE: 2024  TIME: 7:56 PM    Assessment and Plan   Acute cystitis with hematuria [N30.01]  1. Acute cystitis with hematuria  phenazopyridine (PYRIDIUM) 200 mg tablet    sulfamethoxazole-trimethoprim (BACTRIM DS) 800-160 mg per tablet    DISCONTINUED: sulfamethoxazole-trimethoprim (BACTRIM DS) 800-160 mg per tablet      2. Painful urination  POCT urine dip    Urine culture    Urine culture    sulfamethoxazole-trimethoprim (BACTRIM DS) 800-160 mg per tablet            Patient Instructions       Follow up with PCP in 3-5 days.  Proceed to  ER if symptoms worsen.    If tests have been performed at [facility], our office will contact you with results if changes need to be made to the care plan discussed with you at the visit.  You can review your full results on [facility] MyChart.    Chief Complaint     Chief Complaint   Patient presents with    Possible UTI     Started today with painful urination, last urination was bloody, denies fever, reports frequency         History of Present Illness       69-year-old female presenting with increased urinary frequency, burning and difficulty voiding for the past 2 hours.  Denies any flank pain or back pain.  Denies any fevers or chills.        Review of Systems   Review of Systems   Constitutional: Negative.    HENT: Negative.     Eyes: Negative.    Respiratory: Negative.     Cardiovascular: Negative.    Gastrointestinal: Negative.    Genitourinary:  Positive for dysuria and frequency.   Musculoskeletal: Negative.    Skin: Negative.    Allergic/Immunologic: Negative.    Neurological: Negative.    Hematological: Negative.    Psychiatric/Behavioral: Negative.           Current Medications       Current Outpatient Medications:     phenazopyridine (PYRIDIUM) 200 mg tablet, Take 1 tablet (200 mg total) by mouth 3 (three) times a day with meals, Disp: 10  tablet, Rfl: 0    sulfamethoxazole-trimethoprim (BACTRIM DS) 800-160 mg per tablet, Take 1 tablet by mouth every 12 (twelve) hours for 7 days, Disp: 14 tablet, Rfl: 0    Ascorbic Acid (vitamin C) 1000 MG tablet, Take 1,000 mg by mouth daily, Disp: , Rfl:     Calcium Carbonate+Vitamin D 600-200 MG-UNIT TABS, Take 1 tablet by mouth every 24 hours, Disp: , Rfl:     cholecalciferol (VITAMIN D3) 1,000 units tablet, Take 2,000 Units by mouth daily, Disp: , Rfl:     fish oil-omega-3 fatty acids 1000 MG capsule, Take 1 capsule by mouth daily, Disp: , Rfl:     IODINE EX, Apply topically daily, Disp: , Rfl:     Multiple Vitamins-Minerals (CENTRUM SILVER 50+WOMEN PO), Take 1 tablet by mouth every 24 hours, Disp: , Rfl:     omeprazole (PriLOSEC) 20 mg delayed release capsule, Take 1 capsule (20 mg total) by mouth daily, Disp: 90 capsule, Rfl: 1    OXYQUINOLONE SULFATE VAGINAL (Trimo-Guardado) 0.025 % GEL, Insert 1 Application into the vagina once a week, Disp: 113.4 g, Rfl: 3    Red Yeast Rice 600 MG CAPS, , Disp: , Rfl:     rosuvastatin (CRESTOR) 5 mg tablet, Take 5 mg by mouth daily, Disp: , Rfl:     vitamin E, tocopherol, 200 units capsule, Take 3 capsules (600 Units total) by mouth daily, Disp: 90 capsule, Rfl: 11    vitamin k 100 MCG tablet, Take 1 tablet by mouth every 24 hours, Disp: , Rfl:     Current Allergies     Allergies as of 08/19/2024    (No Known Allergies)            The following portions of the patient's history were reviewed and updated as appropriate: allergies, current medications, past family history, past medical history, past social history, past surgical history and problem list.     Past Medical History:   Diagnosis Date    Abnormal Pap smear of cervix     Arthritis     Torrez esophagus 2021    Torrez's esophagus     Breast cancer (HCC)     Cancer (HCC) 2009    left breast    Cholelithiasis     Colon polyp     Cystocele with rectocele     now has pessary    Fatty liver     Female infertility Early 1980's     Fracture of 5th metatarsal 11/28/2023    GERD (gastroesophageal reflux disease)     Hemorrhoids     Hyperlipidemia     Hypothyroidism 1970's    Miscarriage     2    Osteopenia     Polycystic ovary syndrome 1970's    Presence of pessary     Pulmonary arterial hypertension (HCC) 2017?    Exercise induced    Varicella Childhood       Past Surgical History:   Procedure Laterality Date    BREAST BIOPSY  2009    BREAST LUMPECTOMY      CHOLECYSTECTOMY      COLONOSCOPY  07/2021    [facility]    COLONOSCOPY      DILATION AND CURETTAGE OF UTERUS      X2    DXA PROCEDURE (HISTORICAL)  12/2020    Osteopenia    MAMMO (HISTORICAL) Bilateral 01/11/2022    [facility] BI-RADS 2. Benign findings scattered areas fibroglandular density; 255 to 50% glandular dense breast tissue    MOHS SURGERY      NE CONIZATION CERVIX W/WO D&C RPR KNIFE/LASER      TONSILLECTOMY      TUBAL LIGATION      UPPER GASTROINTESTINAL ENDOSCOPY         Family History   Problem Relation Age of Onset    Stroke Mother     Cancer Mother         skin    Cancer Father         liver, stomach, prostate, skin    Stomach cancer Father     Cancer Maternal Aunt         poss fallopian tube    Ovarian cancer Cousin     Colon cancer Neg Hx     Colon polyps Neg Hx     Breast cancer Neg Hx     Uterine cancer Neg Hx          Medications have been verified.        Objective   /70 (BP Location: Right arm, Patient Position: Sitting)   Pulse 87   Temp 98.9 °F (37.2 °C) (Tympanic)   Resp 18   Wt 78.7 kg (173 lb 6.4 oz)   SpO2 99%   BMI 29.08 kg/m²   No LMP recorded. Patient is postmenopausal.       Physical Exam     Physical Exam  Vitals and nursing note reviewed.   Constitutional:       Appearance: She is well-developed.   HENT:      Head: Normocephalic.      Nose: Nose normal.   Eyes:      Pupils: Pupils are equal, round, and reactive to light.   Cardiovascular:      Rate and Rhythm: Normal rate and regular rhythm.   Pulmonary:      Effort: Pulmonary effort is normal.    Abdominal:      General: Abdomen is flat.   Musculoskeletal:         General: Normal range of motion.      Cervical back: Normal range of motion.   Skin:     General: Skin is warm and dry.   Neurological:      Mental Status: She is alert and oriented to person, place, and time.

## 2024-08-20 ENCOUNTER — TELEPHONE (OUTPATIENT)
Age: 69
End: 2024-08-20

## 2024-08-20 NOTE — TELEPHONE ENCOUNTER
Doesn't need to remove pessary due to UTI.  If she prefers to leave it out during treatment, then I would recommend replacing when antibiotics finished.  But is her prolapse is bothering her, she can replace now.

## 2024-08-20 NOTE — TELEPHONE ENCOUNTER
Patient called in stating that she went to Urgent care last night and patient is being treated for UTI. Patient reporting that she removed her pessary and would like to know when she should put it back in.     Patient reporting that if patient doesn't answer return phone call, pt reporting it is ok to leave a detailed message.

## 2024-08-22 LAB
BACTERIA UR CULT: ABNORMAL
Lab: ABNORMAL
SL AMB ANTIMICROBIAL SUSCEPTIBILITY: ABNORMAL

## 2024-08-26 ENCOUNTER — TELEPHONE (OUTPATIENT)
Dept: URGENT CARE | Facility: CLINIC | Age: 69
End: 2024-08-26

## 2024-08-26 NOTE — TELEPHONE ENCOUNTER
Called to speak with patient regarding urine culture results. Left voicemail with call back number for clinic.

## 2024-08-29 ENCOUNTER — TELEPHONE (OUTPATIENT)
Dept: URGENT CARE | Facility: CLINIC | Age: 69
End: 2024-08-29

## 2024-08-29 ENCOUNTER — TELEPHONE (OUTPATIENT)
Age: 69
End: 2024-08-29

## 2024-08-29 DIAGNOSIS — K22.70 BARRETT'S ESOPHAGUS WITHOUT DYSPLASIA: ICD-10-CM

## 2024-08-29 DIAGNOSIS — N39.0 E. COLI UTI: Primary | ICD-10-CM

## 2024-08-29 DIAGNOSIS — B96.20 E. COLI UTI: Primary | ICD-10-CM

## 2024-08-29 NOTE — TELEPHONE ENCOUNTER
Spoke with patient who reports she was seen in UC on 8/19 for UTI symptoms.  She advised they gave her antibiotic which she finished but called today to tell her that the antibiotic was not appropriate to treat the infection based on the culture. She denies any current symptoms but is going away and would like to know if she can get another culture done to make sure it's gone.

## 2024-08-29 NOTE — TELEPHONE ENCOUNTER
Called spoke with patient about culture results.  Showed some resistance to Bactrim.  Patient reports that she finished off her course of treatment and is currently asymptomatic for UTI symptoms.  At this point I do not feel is appropriate to start on antibiotics since symptoms have resolved.  Recommend follow-up with PCP

## 2024-09-14 LAB
BACTERIA UR CULT: NORMAL
Lab: NO GROWTH

## 2024-09-16 ENCOUNTER — NURSE TRIAGE (OUTPATIENT)
Age: 69
End: 2024-09-16

## 2024-09-16 ENCOUNTER — OFFICE VISIT (OUTPATIENT)
Dept: OBGYN CLINIC | Facility: CLINIC | Age: 69
End: 2024-09-16
Payer: MEDICARE

## 2024-09-16 VITALS
BODY MASS INDEX: 28.99 KG/M2 | DIASTOLIC BLOOD PRESSURE: 62 MMHG | WEIGHT: 174 LBS | SYSTOLIC BLOOD PRESSURE: 116 MMHG | HEIGHT: 65 IN

## 2024-09-16 DIAGNOSIS — L29.3 PERINEAL PRURITUS IN FEMALE: Primary | ICD-10-CM

## 2024-09-16 DIAGNOSIS — B37.31 VAGINAL YEAST INFECTION: ICD-10-CM

## 2024-09-16 PROCEDURE — 99213 OFFICE O/P EST LOW 20 MIN: CPT | Performed by: OBSTETRICS & GYNECOLOGY

## 2024-09-16 RX ORDER — FLUCONAZOLE 150 MG/1
150 TABLET ORAL ONCE
Qty: 1 TABLET | Refills: 0 | Status: SHIPPED | OUTPATIENT
Start: 2024-09-16 | End: 2024-09-16

## 2024-09-16 NOTE — PROGRESS NOTES
Assessment/Plan:      Diagnoses and all orders for this visit:    Perineal pruritus in female     - Wet mount findings c/w vaginal yeast     -  informed patient and Rx for Diflucan provided    Vaginal yeast infection  -     fluconazole (DIFLUCAN) 150 mg tablet; Take 1 tablet (150 mg total) by mouth once for 1 dose    Other orders  -     Liquid-based pap, screening          Subjective:     Patient ID: Luz Pardo is a 69 y.o. female.    Patient presents with perineal discomfort after having been treated with 2 rounds of Abx.  Denies any concerns regarding pessary and pessary maintenance         Review of Systems   Constitutional:  Negative for activity change, chills, fever and unexpected weight change.   Genitourinary:  Positive for vaginal discharge. Negative for dysuria, genital sores, hematuria, pelvic pain, vaginal bleeding and vaginal pain.         Objective:     Physical Exam

## 2024-09-16 NOTE — TELEPHONE ENCOUNTER
"Patient calling in stating that she's having vaginal itching and pt reporting feeling like \"something is sticking me\". Pt reporting this started about a week ago. Pt reporting pain 3/10 in the vagina. Pt reporting itching 3/10. Pt reporting she was just treated for UTI last month on 8/20/24.     Pt requesting to be seen    Appt scheduled for today at 245pm.         Reason for Disposition   Patient wants to be seen    Answer Assessment - Initial Assessment Questions  1. SYMPTOM: \"What's the main symptom you're concerned about?\" (e.g., pain, itching, dryness)      Patient calling in stating that she's having vaginal itching and pt reporting feeling like \"something is sticking me\".   2. LOCATION: \"Where is the  itching  located?\" (e.g., inside/outside, left/right)      Internally   3. ONSET: \"When did the  symptoms   start?\"      A week ago   4. PAIN: \"Is there any pain?\" If Yes, ask: \"How bad is it?\" (Scale: 1-10; mild, moderate, severe)      3/10  5. ITCHING: \"Is there any itching?\" If Yes, ask: \"How bad is it?\" (Scale: 1-10; mild, moderate, severe)       Pt reporting itching 3/10.   6. CAUSE: \"What do you think is causing the discharge?\" \"Have you had the same problem before? What happened then?\"      Pt denies   7. OTHER SYMPTOMS: \"Do you have any other symptoms?\" (e.g., fever, itching, vaginal bleeding, pain with urination, injury to genital area, vaginal foreign body)      Pt denies  fever, vaginal bleeding, pain with urination, injury to genital area    Protocols used: Vaginal Symptoms-ADULT-[place]    "

## 2024-09-18 PROBLEM — N30.01 ACUTE CYSTITIS WITH HEMATURIA: Status: RESOLVED | Noted: 2024-08-19 | Resolved: 2024-09-18

## 2024-09-23 ENCOUNTER — NURSE TRIAGE (OUTPATIENT)
Age: 69
End: 2024-09-23

## 2024-09-23 NOTE — TELEPHONE ENCOUNTER
"Patient calling in to report continued vaginal discomfort. Patient was seen 9/16 in office and was found to have vaginal yeast infection. Patient took diflucan as prescribed, states by 9/20, seemed like maybe symptoms were clearing up but then over the weekend they returned.     Currently reports vaginal itching and discomfort. Reports abnormal vaginal odor, but states has pessary so difficult to tell. Denies fevers or vaginal bleeding.     Pharmacy on file confirmed  Patient states ok to LM on home or mobile phone.     Routing to provider for recommendation.  Answer Assessment - Initial Assessment Questions  1. SYMPTOM: \"What's the main symptom you're concerned about?\" (e.g., pain, itching, dryness)      Vaginal itching, discomfort  2. LOCATION: \"Where is the  s/s located?\" (e.g., inside/outside, left/right)  Internal and external  3. ONSET: \"When did the  s/s  start?\"      Since before office visit   4. PAIN: \"Is there any pain?\" If Yes, ask: \"How bad is it?\" (Scale: 1-10; mild, moderate, severe)      Denies  5. ITCHING: \"Is there any itching?\" If Yes, ask: \"How bad is it?\" (Scale: 1-10; mild, moderate, severe)      3/10  6. CAUSE: \"What do you think is causing the discharge?\" \"Have you had the same problem before? What happened then?\"      Unsure   7. OTHER SYMPTOMS: \"Do you have any other symptoms?\" (e.g., fever, itching, vaginal bleeding, pain with urination, injury to genital area, vaginal foreign body)      Denies  8. PREGNANCY: \"Is there any chance you are pregnant?\" \"When was your last menstrual period?\"      N/A    Protocols used: Vaginal Symptoms-ADULT-[place]    "

## 2024-09-25 NOTE — TELEPHONE ENCOUNTER
Advise patient to use OTC monistat. If symptoms persist would advise to return to office for exam.  Patient's increase in vaginal discharge could be from having pessary.

## 2024-10-02 ENCOUNTER — OFFICE VISIT (OUTPATIENT)
Dept: GASTROENTEROLOGY | Facility: CLINIC | Age: 69
End: 2024-10-02
Payer: MEDICARE

## 2024-10-02 VITALS
BODY MASS INDEX: 28.56 KG/M2 | DIASTOLIC BLOOD PRESSURE: 73 MMHG | HEIGHT: 65 IN | WEIGHT: 171.4 LBS | SYSTOLIC BLOOD PRESSURE: 148 MMHG

## 2024-10-02 DIAGNOSIS — K76.0 FATTY LIVER: Primary | ICD-10-CM

## 2024-10-02 DIAGNOSIS — K22.70 BARRETT'S ESOPHAGUS WITHOUT DYSPLASIA: ICD-10-CM

## 2024-10-02 DIAGNOSIS — Z86.0100 HISTORY OF COLON POLYPS: ICD-10-CM

## 2024-10-02 PROCEDURE — 99214 OFFICE O/P EST MOD 30 MIN: CPT | Performed by: INTERNAL MEDICINE

## 2024-10-02 PROCEDURE — G2211 COMPLEX E/M VISIT ADD ON: HCPCS | Performed by: INTERNAL MEDICINE

## 2024-10-02 NOTE — PROGRESS NOTES
[facility] - Outpatient Follow-up Note  Luz Pardo 69 y.o. female MRN: [id number]  Encounter: [id number]    ASSESSMENT AND PLAN:      1. Fatty liver  69-year-old female here today for follow-up.  No issues.  Still drinks wine daily.  LFTs have been normal.  Elastography F0 to F1.    - vitamin E, tocopherol, 200 units capsule; Take 3 capsules (600 Units total) by mouth daily  - Comprehensive metabolic panel  - CBC and differential    2. Torrez's esophagus without dysplasia  Recall EGD July 2027    - omeprazole (PriLOSEC) 20 mg delayed release capsule; Take 1 capsule (20 mg total) by mouth daily  Dispense: 90 capsule; Refill: 3  - Comprehensive metabolic panel  - CBC and differential    3. History of colon polyps  Recall July 2026      Followup Appointment: 1 year  ______________________________________________________________________    Chief Complaint   Patient presents with    Yearly follow up GERD     HPI: 69-year-old female here today for follow-up.  No complaints.  Denies any abdominal pain, heartburn, nausea vomiting dysphagia.  No GI bleeding.  Weight is stable.  Going back to the gym.  Admits she forgot to take her vitamin E but will check her multivitamin.  Still drinks wine but has cut down quite a bit.    Historical Information   Past Medical History:   Diagnosis Date    Abnormal Pap smear of cervix     Arthritis     Torrez esophagus 2021    Torrez's esophagus     Breast cancer (HCC)     Cancer (HCC) 2009    left breast    Cholelithiasis     Colon polyp     Cystocele with rectocele     now has pessary    Fatty liver     Female infertility Early 1980's    Fracture of 5th metatarsal 11/28/2023    GERD (gastroesophageal reflux disease)     Hemorrhoids     Hyperlipidemia     Hypothyroidism 1970's    Miscarriage     2    Osteopenia     Polycystic ovary syndrome 1970's    Presence of pessary     Pulmonary arterial hypertension (HCC) 2017?    Exercise  induced    Varicella Childhood     Past Surgical History:   Procedure Laterality Date    BREAST BIOPSY  2009    BREAST LUMPECTOMY      CHOLECYSTECTOMY      COLONOSCOPY  2021    [facility]    COLONOSCOPY      DILATION AND CURETTAGE OF UTERUS      X2    DXA PROCEDURE (HISTORICAL)  2020    Osteopenia    MAMMO (HISTORICAL) Bilateral 2022    [facility] BI-RADS 2. Benign findings scattered areas fibroglandular density; 255 to 50% glandular dense breast tissue    MOHS SURGERY      [place] CONIZATION CERVIX W/WO D&C RPR KNIFE/LASER      TONSILLECTOMY      TUBAL LIGATION      UPPER GASTROINTESTINAL ENDOSCOPY       Social History     Substance and Sexual Activity   Alcohol Use Yes    Alcohol/week: 16.0 - 25.0 standard drinks of alcohol    Types: 14 - 21 Glasses of wine, 2 - 4 Cans of beer per week     Social History     Substance and Sexual Activity   Drug Use Yes    Types: Marijuana    Comment: occasional edibles/last time within the last week     Social History     Tobacco Use   Smoking Status Former    Current packs/day: 0.00    Types: Cigarettes    Start date: 1965    Quit date: 1975    Years since quittin.7   Smokeless Tobacco Never   Tobacco Comments    Very light smoker     Family History   Problem Relation Age of Onset    Stroke Mother     Cancer Mother         skin    Cancer Father         liver, stomach, prostate, skin    Stomach cancer Father     Cancer Maternal Aunt         poss fallopian tube    Ovarian cancer Cousin     Colon cancer Neg Hx     Colon polyps Neg Hx     Breast cancer Neg Hx     Uterine cancer Neg Hx          Current Outpatient Medications:     Ascorbic Acid (vitamin C) 1000 MG tablet    Calcium Carbonate+Vitamin D 600-200 MG-UNIT TABS    cholecalciferol (VITAMIN D3) 1,000 units tablet    fish oil-omega-3 fatty acids 1000 MG capsule    IODINE EX    Multiple Vitamins-Minerals (CENTRUM SILVER 50+WOMEN PO)    omeprazole (PriLOSEC) 20 mg delayed release capsule    rosuvastatin (CRESTOR) 5  "mg tablet    vitamin E, tocopherol, 200 units capsule    vitamin k 100 MCG tablet  No Known Allergies  Reviewed medications and allergies and updated as indicated    PHYSICAL EXAM:    Blood pressure 148/73, height 5' 4.75\" (1.645 m), weight 77.7 kg (171 lb 6.4 oz), not currently breastfeeding. Body mass index is 28.74 kg/m².  General Appearance: NAD, cooperative, alert  Eyes: Anicteric, conjunctiva pink  ENT:  Normocephalic, atraumatic, normal mucosa.    Neck:  Supple, symmetrical, trachea midline  Resp:  Clear to auscultation bilaterally; no rales, rhonchi or wheezing; respirations unlabored   CV:  S1 S2, Regular rate and rhythm; no murmur, rub, or gallop.  GI:  Soft, non-tender, non-distended; normal bowel sounds; no masses, no organomegaly   Rectal: Deferred  Musculoskeletal: No cyanosis, clubbing or edema. Normal ROM.  Skin:  No jaundice, rashes, or lesions   Heme/Lymph: No palpable cervical lymphadenopathy  Psych: Normal affect, good eye contact  Neuro: No gross deficits, AAOx3    Lab Results:   Lab Results   Component Value Date    WBC 5.8 12/05/2023    HGB 14.1 12/05/2023    HCT 42.4 12/05/2023    MCV 96 12/05/2023     12/05/2023     Lab Results   Component Value Date    K 4.7 12/05/2023     (H) 12/05/2023    CO2 19 (L) 12/05/2023    BUN 15 12/05/2023    CREATININE 0.71 12/05/2023    AST 24 12/05/2023    ALT 14 12/05/2023    EGFR 91 12/05/2023       Radiology Results:   No results found.    "

## 2024-11-20 ENCOUNTER — NURSE TRIAGE (OUTPATIENT)
Age: 69
End: 2024-11-20

## 2024-11-20 NOTE — TELEPHONE ENCOUNTER
Patients GI provider:  Dr. Galdamez    Number to return call: [phone]    Reason for call: Pt calling stating she was in [place] OCT 29th, got told it was like food poisoning by the ER there. Was given rifaximin. Still gets crampy, bowels not right, occasional nausea. Patient doesn't know if it should be better yet or not. Please call and discuss    Scheduled procedure/appointment date if applicable: N/A

## 2024-11-20 NOTE — TELEPHONE ENCOUNTER
"Last OV 10/2/24 Dr. Galdamez Hx Fatty Liver, Torrez's Esophagus     Pt reports soft stools; frequency varies 1-5 x day. Intermittent cramping worse after eating. Endorses bouts of nausea worse if she does not eat. Having a lot of gas. States she was prescribed Xifaxan 200 mg 2 tabs BID x 3 days. Not currently taking anything OTC however Pepto did help in the past. Takes Omeprazole 20 mg daily.      Denies vomiting, fever, melena, bloody stool. Pt to try Gas X and Mylanta in the interim. Discussed bowel rest with liquids with progression to bland diet as tolerated. Pt verbalized understanding.    Please advise if you have additional recommendations.     Pt uses [id number].    Reason for Disposition   Mild abdominal pain    Answer Assessment - Initial Assessment Questions  1. LOCATION: \"Where does it hurt?\"       Generalized cramping  3. ONSET: \"When did the pain begin?\" (e.g., minutes, hours or days ago)       Since trip to [facility]  5. PATTERN \"Does the pain come and go, or is it constant?\"      Intermittent  6. SEVERITY: \"How bad is the pain?\"  (e.g., Scale 1-10; mild, moderate, or severe)      Mild   7. RECURRENT SYMPTOM: \"Have you ever had this type of stomach pain before?\" If Yes, ask: \"When was the last time?\" and \"What happened that time?\"       No  8. CAUSE: \"What do you think is causing the stomach pain?\"      Food poisoning  9. RELIEVING/AGGRAVATING FACTORS: \"What makes it better or worse?\" (e.g., antacids, bending or twisting motion, bowel movement)      Eating  10. OTHER SYMPTOMS: \"Do you have any other symptoms?\" (e.g., back pain, diarrhea, fever, urination pain, vomiting)        Nausea and gas    Protocols used: Abdominal Pain - Female-Adult-[place]    "

## 2024-11-20 NOTE — TELEPHONE ENCOUNTER
Agree with nursing rec's as below. Consider acute office visit if no improvement in 48 hrs after starting new rec's.

## 2024-12-11 ENCOUNTER — NURSE TRIAGE (OUTPATIENT)
Age: 69
End: 2024-12-11

## 2024-12-11 NOTE — TELEPHONE ENCOUNTER
"Patient is calling in, she has had  pessary in place for 2 years. She reports on Monday she had pink spotting when wiping and also on her underwear. She states it was only for that day and it went away. She denies any pain or unusual discharge. She took her pessary out yesterday and didn't see any blood. She was in for an appt in September, she states the pessary was taken out at that time and everything looked ok.     Routing to provider for f/u recommendations      Reason for Disposition  • Vaginal pessary, questions about    Answer Assessment - Initial Assessment Questions  1. SYMPTOM: \"What's the main symptom you're concerned about?\" (e.g., pain, itching, dryness)      Pink spotting- when wiping, did have stain on underwear, went away and didn't have again   3. ONSET: \"When did the  symptoms  start?\"      Monday   4. PAIN: \"Is there any pain?\" If Yes, ask: \"How bad is it?\" (Scale: 1-10; mild, moderate, severe)      Denies   6. CAUSE: \"What do you think is causing the discharge?\" \"Have you had the same problem before?\" \"What happened then?\"      Denies   7. OTHER SYMPTOMS: \"Do you have any other symptoms?\" (e.g., fever, itching, vaginal bleeding, pain with urination, injury to genital area, vaginal foreign body)      Pessary- took out yesterday and didn't notice any blood on    Protocols used: Vaginal Symptoms-Adult-[place]    "

## 2024-12-11 NOTE — TELEPHONE ENCOUNTER
Regarding: vaginal bleeding pink in color and has pessary  ----- Message from Masha LARES sent at 12/11/2024  9:05 AM EST -----  Patient had vaginal bleeding pink in color and has a pessary. Please advise.

## 2024-12-14 NOTE — PROGRESS NOTES
Assessment/Plan:    PMB (postmenopausal bleeding)  Intermittent spotting with straining. No heavy bleeding.   Self care of ring pessary with support for cystocele.   Exam with minimal erosion.   Pessary cleaned and replaced.   Will check u/s for EMS, if >4 mm will need to RTO for endo bx. Will need Cytotec for pinpoint cervix.       Diagnoses and all orders for this visit:    PMB (postmenopausal bleeding)  -     US pelvis complete w transvaginal; Future        Subjective:      Patient ID: Luz Pardo is a 69 y.o. female.    HPI noted spotting with straining from vagina (self cleans pessary, no blood on pessary)    The following portions of the patient's history were reviewed and updated as appropriate: She  has a past medical history of Abnormal Pap smear of cervix, Arthritis, Torrez esophagus (2021), Torrez's esophagus, Breast cancer (HCC), Cancer (HCC) (2009), Cholelithiasis, Colon polyp, Cystocele with rectocele, Fatty liver, Female infertility (Early 1980's), Fracture of 5th metatarsal (11/28/2023), GERD (gastroesophageal reflux disease), Hemorrhoids, Hyperlipidemia, Hypothyroidism (1970's), Miscarriage, Osteopenia, Polycystic ovary syndrome (1970's), Presence of pessary, Pulmonary arterial hypertension (HCC) (2017?), and Varicella (Childhood).  She   Patient Active Problem List    Diagnosis Date Noted    PMB (postmenopausal bleeding) 12/16/2024    Painful urination 08/19/2024    Osteopenia of left femoral neck 02/16/2023    Midline cystocele 11/10/2022    Torrez's esophagus without dysplasia 09/02/2022    Personal history of breast cancer - left 12/02/2021    History of abnormal cervical Pap smear - LEEP for LGSIL 2013 12/02/2021    Liver cyst 06/16/2021    Fatty liver 06/16/2021    History of colon polyps 10/30/2019    Hemorrhoids 10/30/2019     She  has a past surgical history that includes Colonoscopy (07/2021); Tubal ligation; Cholecystectomy; Mohs surgery; Breast lumpectomy; Mammo  "(historical) (Bilateral, 01/11/2022); Breast biopsy (2009); pr conization cervix w/wo d&c rpr knife/laser; DXA procedure(historical) (12/2020); Dilation and curettage of uterus; Tonsillectomy; Upper gastrointestinal endoscopy; and Colonoscopy.  Her family history includes Cancer in her father, maternal aunt, and mother; Ovarian cancer in her cousin; Stomach cancer in her father; Stroke in her mother.  She  reports that she quit smoking about 49 years ago. Her smoking use included cigarettes. She started smoking about 59 years ago. She has never used smokeless tobacco. She reports current alcohol use of about 16.0 - 25.0 standard drinks of alcohol per week. She reports current drug use. Drug: Marijuana.  Current Outpatient Medications   Medication Sig Dispense Refill    Ascorbic Acid (vitamin C) 1000 MG tablet Take 1,000 mg by mouth daily      Calcium Carbonate+Vitamin D 600-200 MG-UNIT TABS Take 1 tablet by mouth every 24 hours      cholecalciferol (VITAMIN D3) 1,000 units tablet Take 2,000 Units by mouth daily      fish oil-omega-3 fatty acids 1000 MG capsule Take 1 capsule by mouth daily      IODINE EX Apply topically daily      Multiple Vitamins-Minerals (CENTRUM SILVER 50+WOMEN PO) Take 1 tablet by mouth every 24 hours      omeprazole (PriLOSEC) 20 mg delayed release capsule Take 1 capsule (20 mg total) by mouth daily 90 capsule 3    rosuvastatin (CRESTOR) 5 mg tablet Take 5 mg by mouth daily      vitamin k 100 MCG tablet Take 1 tablet by mouth every 24 hours      vitamin E, tocopherol, 200 units capsule Take 3 capsules (600 Units total) by mouth daily       No current facility-administered medications for this visit.     She has no known allergies..    Review of Systems  +PMB    Objective:    /70 (BP Location: Left arm, Patient Position: Sitting, Cuff Size: Standard)   Ht 5' 4.75\" (1.645 m)   Wt 75.5 kg (166 lb 6.4 oz)   BMI 27.90 kg/m²      Physical Exam    Appears well, no apparent distress.   Does " not appear anxious or depressed.  Pelvic exam: atrophic external genitalia, vagina atrophic, pessary cleaned and replaced, small area of erosion (nonfriable) on posterior fornex, cervix without lesions, uterus small, no masses, nontender  Rectal deferred

## 2024-12-16 ENCOUNTER — OFFICE VISIT (OUTPATIENT)
Dept: OBGYN CLINIC | Facility: CLINIC | Age: 69
End: 2024-12-16
Payer: MEDICARE

## 2024-12-16 VITALS
BODY MASS INDEX: 27.72 KG/M2 | HEIGHT: 65 IN | DIASTOLIC BLOOD PRESSURE: 70 MMHG | WEIGHT: 166.4 LBS | SYSTOLIC BLOOD PRESSURE: 146 MMHG

## 2024-12-16 DIAGNOSIS — N95.0 PMB (POSTMENOPAUSAL BLEEDING): Primary | ICD-10-CM

## 2024-12-16 PROCEDURE — 99213 OFFICE O/P EST LOW 20 MIN: CPT | Performed by: OBSTETRICS & GYNECOLOGY

## 2024-12-16 NOTE — ASSESSMENT & PLAN NOTE
Intermittent spotting with straining. No heavy bleeding.   Self care of ring pessary with support for cystocele.   Exam with minimal erosion.   Pessary cleaned and replaced.   Will check u/s for EMS, if >4 mm will need to RTO for endo bx. Will need Cytotec for pinpoint cervix.

## 2024-12-20 DIAGNOSIS — K22.70 BARRETT'S ESOPHAGUS WITHOUT DYSPLASIA: ICD-10-CM

## 2025-01-08 ENCOUNTER — RESULTS FOLLOW-UP (OUTPATIENT)
Dept: OBGYN CLINIC | Facility: CLINIC | Age: 70
End: 2025-01-08

## 2025-01-08 DIAGNOSIS — N88.2 CERVICAL STENOSIS (UTERINE CERVIX): Primary | ICD-10-CM

## 2025-01-08 RX ORDER — MISOPROSTOL 100 UG/1
TABLET ORAL
Qty: 1 TABLET | Refills: 0 | Status: SHIPPED | OUTPATIENT
Start: 2025-01-08

## 2025-01-08 NOTE — TELEPHONE ENCOUNTER
Please contact pt with pelvic u/s results. Will need to RTO for endometrial biopsy due to slightly thickened endometrium with her h/o postmenopausal bleeding.   Pre-procedure please advise to take ibuprofen and I sent rx for cytotec to pharmacy (to more easily dilate cervix). Half of the tablet vaginally the night prior to and half the morning of the procedure.   Please schedule biopsy.   Thanks.

## 2025-01-15 NOTE — PROGRESS NOTES
Name: Luz Pardo      : 1955      MRN: [id number]  Encounter Provider: Lauren Land MD  Encounter Date: 2025   Encounter department: [facility] [place]  :  Assessment & Plan  PMB (postmenopausal bleeding)  70 yo  with pessary, noted intermittent spotting with straining. Erosion noted on exam, u/s obtained.   U/s reviewed with 2 mm EMS with 5 mm irregular endometrial area noted and endocervical debris.   Known cervical stenosis, s/p LEEP.     D/w pt and attempted endo bx with inability to find external os with os finders.   Options reviewed including repeat imaging in 3 months, obtaining MRI for further characterization of endometrium, hysteroscopy D&C.   Would like definitive tissue sampling with personal h/o breast cancer and family h/o uterine cancer.    R&B of procedure reviewed including bleeding, infection, uterine perforation (quoted 1:200-500) and potential need for laparoscopy with perforation. Questions answered and consent signed.  Will rx cytotec pre op.    Orders:    Case request operating room: DILATATION AND CURETTAGE (D&C) WITH HYSTEROSCOPY; SYMPHION, EXAM UNDER ANESTHESIA; Standing    CBC and differential; Future    Comprehensive metabolic panel; Future    EKG 12 lead; Future    Cervical stenosis (uterine cervix)  Will need cytotec pre procedure  Orders:    Case request operating room: DILATATION AND CURETTAGE (D&C) WITH HYSTEROSCOPY; SYMPHION, EXAM UNDER ANESTHESIA; Standing    CBC and differential; Future    Comprehensive metabolic panel; Future    EKG 12 lead; Future        History of Present Illness   Luz Pardo is a 69 y.o. female who presents for u/s review and endometrial biopsy.    Review of Systems   Some discharge, no bleeding    Past Medical History   Past Medical History:   Diagnosis Date    Abnormal Pap smear of cervix     Arthritis     Torrez's esophagus     Breast cancer (HCC) - left     Cholelithiasis      Colon polyp     Cystocele with rectocele     now has pessary    Fatty liver     Female infertility Early 1980's    Fracture of 5th metatarsal 11/28/2023    GERD (gastroesophageal reflux disease)     Hemorrhoids     Hyperlipidemia     Hypothyroidism 1970's    Miscarriage     2    Osteopenia     Polycystic ovary syndrome 1970's    Pulmonary arterial hypertension (HCC) 2017?    Exercise induced    Varicella Childhood     Past Surgical History:   Procedure Laterality Date    BREAST BIOPSY  2009    BREAST LUMPECTOMY      CHOLECYSTECTOMY      COLONOSCOPY  07/2021    [facility]    DILATION AND CURETTAGE OF UTERUS      X2    DXA PROCEDURE (HISTORICAL)  12/2020    Osteopenia    MAMMO (HISTORICAL) Bilateral 01/11/2022    [facility] BI-RADS 2. Benign findings scattered areas fibroglandular density; 255 to 50% glandular dense breast tissue    MOHS SURGERY      MI CONIZATION CERVIX W/WO D&C RPR KNIFE/LASER      TONSILLECTOMY      TUBAL LIGATION      UPPER GASTROINTESTINAL ENDOSCOPY       Family History   Problem Relation Age of Onset    Stroke Mother     Cancer Mother         skin    Cancer Father         liver, stomach, prostate, skin    Stomach cancer Father     Cancer Maternal Aunt         poss fallopian tube    Uterine cancer Cousin         previously thought to be ovarian    Colon cancer Neg Hx     Colon polyps Neg Hx     Breast cancer Neg Hx       reports that she quit smoking about 50 years ago. Her smoking use included cigarettes. She started smoking about 60 years ago. She has never used smokeless tobacco. She reports current alcohol use of about 16.0 - 25.0 standard drinks of alcohol per week. She reports current drug use. Drug: Marijuana.  Current Outpatient Medications on File Prior to Visit   Medication Sig Dispense Refill    Ascorbic Acid (vitamin C) 1000 MG tablet Take 1,000 mg by mouth daily      Calcium Carbonate+Vitamin D 600-200 MG-UNIT TABS Take 1 tablet by mouth every 24 hours      cholecalciferol (VITAMIN D3)  "1,000 units tablet Take 2,000 Units by mouth daily      fish oil-omega-3 fatty acids 1000 MG capsule Take 1 capsule by mouth daily      IODINE EX Apply topically daily      miSOPROStol (Cytotec) 100 mcg tablet 1/2 tablet in the vagina the night prior to the procedure; repeat with the other half the morning of the procedure. 1 tablet 0    Multiple Vitamins-Minerals (CENTRUM SILVER 50+WOMEN PO) Take 1 tablet by mouth every 24 hours      Multiple Vitamins-Minerals (PRESERVISION AREDS 2 PO) Take by mouth      omeprazole (PriLOSEC) 20 mg delayed release capsule Take 1 capsule (20 mg total) by mouth daily 90 capsule 3    rosuvastatin (CRESTOR) 5 mg tablet Take 5 mg by mouth daily      vitamin k 100 MCG tablet Take 1 tablet by mouth every 24 hours      vitamin E, tocopherol, 200 units capsule Take 3 capsules (600 Units total) by mouth daily       No current facility-administered medications on file prior to visit.   No Known Allergies      Objective   /78 (BP Location: Right arm, Patient Position: Sitting, Cuff Size: Standard)   Ht 5' 4.75\" (1.645 m)   Wt 76.2 kg (168 lb)   BMI 28.17 kg/m²      Physical Exam  Appears well, no apparent distress.   Does not appear anxious or depressed.  HEENT: normocephalic, EOEMI, oral pharynx clear  Lungs CTA  CVS RRR  LE without edema or calf tenderness  Appears well, no apparent distress.   Does not appear anxious or depressed.  Pelvic exam: atrophic external genitalia, vagina redundant with apical erosion and friability, cervix stenotic, flush, eroded. VERY difficult to see dimple/os. Unable to find with os finder after betadine and tenaculum.          DATA:  [facility] u/s 1/7/25: AV uterus 6.4 cm with EMS 2 mm with possible 5 mm endometrial irregularity and endocervical debris. R ov 1.9 cm with simple 1.1 cm cyst. L of not seen. No masses or free fluid.   "

## 2025-01-15 NOTE — ASSESSMENT & PLAN NOTE
68 yo  with pessary, noted intermittent spotting with straining. Erosion noted on exam, u/s obtained.   U/s reviewed with 2 mm EMS with 5 mm irregular endometrial area noted and endocervical debris.   Known cervical stenosis, s/p LEEP.     D/w pt and attempted endo bx with inability to find external os with os finders.   Options reviewed including repeat imaging in 3 months, obtaining MRI for further characterization of endometrium, hysteroscopy D&C.   Would like definitive tissue sampling with personal h/o breast cancer and family h/o uterine cancer.    R&B of procedure reviewed including bleeding, infection, uterine perforation (quoted 1:200-500) and potential need for laparoscopy with perforation. Questions answered and consent signed.  Will rx cytotec pre op.    Orders:    Case request operating room: DILATATION AND CURETTAGE (D&C) WITH HYSTEROSCOPY; SYMPHION, EXAM UNDER ANESTHESIA; Standing    CBC and differential; Future    Comprehensive metabolic panel; Future    EKG 12 lead; Future

## 2025-01-17 ENCOUNTER — PROCEDURE VISIT (OUTPATIENT)
Dept: OBGYN CLINIC | Facility: CLINIC | Age: 70
End: 2025-01-17
Payer: MEDICARE

## 2025-01-17 VITALS
HEIGHT: 65 IN | WEIGHT: 168 LBS | BODY MASS INDEX: 27.99 KG/M2 | SYSTOLIC BLOOD PRESSURE: 138 MMHG | DIASTOLIC BLOOD PRESSURE: 78 MMHG

## 2025-01-17 DIAGNOSIS — N95.0 PMB (POSTMENOPAUSAL BLEEDING): Primary | ICD-10-CM

## 2025-01-17 DIAGNOSIS — N88.2 CERVICAL STENOSIS (UTERINE CERVIX): ICD-10-CM

## 2025-01-17 PROCEDURE — 99214 OFFICE O/P EST MOD 30 MIN: CPT | Performed by: OBSTETRICS & GYNECOLOGY

## 2025-01-17 NOTE — LETTER
2025     [facility]  [address]  [facility] [address]    Patient: Luz Pardo   YOB: 1955   Date of Visit: 2025       Dear  States:    Luz Pardo was in to see me today for evaluation. Below are my notes for this visit.    If you have questions, please do not hesitate to call me. I look forward to following your patient along with you.         Sincerely,        Lauren Land MD        CC: No Recipients    Lauren Land MD  2025 12:52 PM  Sign when Signing Visit  Name: Luz Pardo      : 1955      MRN: [id number]  Encounter Provider: Lauren Land MD  Encounter Date: 2025   Encounter department: [facility] [place]  :  Assessment & Plan  PMB (postmenopausal bleeding)  68 yo  with pessary, noted intermittent spotting with straining. Erosion noted on exam, u/s obtained.   U/s reviewed with 2 mm EMS with 5 mm irregular endometrial area noted and endocervical debris.   Known cervical stenosis, s/p LEEP.     D/w pt and attempted endo bx with inability to find external os with os finders.   Options reviewed including repeat imaging in 3 months, obtaining MRI for further characterization of endometrium, hysteroscopy D&C.   Would like definitive tissue sampling with personal h/o breast cancer and family h/o uterine cancer.    R&B of procedure reviewed including bleeding, infection, uterine perforation (quoted 1:200-500) and potential need for laparoscopy with perforation. Questions answered and consent signed.  Will rx cytotec pre op.    Orders:  •  Case request operating room: DILATATION AND CURETTAGE (D&C) WITH HYSTEROSCOPY; SYMPHION, EXAM UNDER ANESTHESIA; Standing  •  CBC and differential; Future  •  Comprehensive metabolic panel; Future  •  EKG 12 lead; Future    Cervical stenosis (uterine cervix)  Will need cytotec pre procedure  Orders:  •  Case request operating room: DILATATION AND CURETTAGE (D&C)  WITH HYSTEROSCOPY; SYMPHION, EXAM UNDER ANESTHESIA; Standing  •  CBC and differential; Future  •  Comprehensive metabolic panel; Future  •  EKG 12 lead; Future        History of Present Illness  Luz Pardo is a 69 y.o. female who presents for u/s review and endometrial biopsy.    Review of Systems   Some discharge, no bleeding    Past Medical History  Past Medical History:   Diagnosis Date   • Abnormal Pap smear of cervix    • Arthritis    • Torrez's esophagus    • Breast cancer (HCC) - left 2009   • Cholelithiasis    • Colon polyp    • Cystocele with rectocele     now has pessary   • Fatty liver    • Female infertility Early 1980's   • Fracture of 5th metatarsal 11/28/2023   • GERD (gastroesophageal reflux disease)    • Hemorrhoids    • Hyperlipidemia    • Hypothyroidism 1970's   • Miscarriage     2   • Osteopenia    • Polycystic ovary syndrome 1970's   • Pulmonary arterial hypertension (HCC) 2017?    Exercise induced   • Varicella Childhood     Past Surgical History:   Procedure Laterality Date   • BREAST BIOPSY  2009   • BREAST LUMPECTOMY     • CHOLECYSTECTOMY     • [facility]   • DILATION AND CURETTAGE OF UTERUS      X2   • DXA PROCEDURE (HISTORICAL)  12/2020    Osteopenia   • MAMMO (HISTORICAL) Bilateral 01/11/2022    [facility] BI-RADS 2. Benign findings scattered areas fibroglandular density; 255 to 50% glandular dense breast tissue   • MOHS SURGERY     • FL CONIZATION CERVIX W/WO D&C RPR KNIFE/LASER     • TONSILLECTOMY     • TUBAL LIGATION     • UPPER GASTROINTESTINAL ENDOSCOPY       Family History   Problem Relation Age of Onset   • Stroke Mother    • Cancer Mother         skin   • Cancer Father         liver, stomach, prostate, skin   • Stomach cancer Father    • Cancer Maternal Aunt         poss fallopian tube   • Uterine cancer Cousin         previously thought to be ovarian   • Colon cancer Neg Hx    • Colon polyps Neg Hx    • Breast cancer Neg Hx       reports that she  "quit smoking about 50 years ago. Her smoking use included cigarettes. She started smoking about 60 years ago. She has never used smokeless tobacco. She reports current alcohol use of about 16.0 - 25.0 standard drinks of alcohol per week. She reports current drug use. Drug: Marijuana.  Current Outpatient Medications on File Prior to Visit   Medication Sig Dispense Refill   • Ascorbic Acid (vitamin C) 1000 MG tablet Take 1,000 mg by mouth daily     • Calcium Carbonate+Vitamin D 600-200 MG-UNIT TABS Take 1 tablet by mouth every 24 hours     • cholecalciferol (VITAMIN D3) 1,000 units tablet Take 2,000 Units by mouth daily     • fish oil-omega-3 fatty acids 1000 MG capsule Take 1 capsule by mouth daily     • IODINE EX Apply topically daily     • miSOPROStol (Cytotec) 100 mcg tablet 1/2 tablet in the vagina the night prior to the procedure; repeat with the other half the morning of the procedure. 1 tablet 0   • Multiple Vitamins-Minerals (CENTRUM SILVER 50+WOMEN PO) Take 1 tablet by mouth every 24 hours     • Multiple Vitamins-Minerals (PRESERVISION AREDS 2 PO) Take by mouth     • omeprazole (PriLOSEC) 20 mg delayed release capsule Take 1 capsule (20 mg total) by mouth daily 90 capsule 3   • rosuvastatin (CRESTOR) 5 mg tablet Take 5 mg by mouth daily     • vitamin k 100 MCG tablet Take 1 tablet by mouth every 24 hours     • vitamin E, tocopherol, 200 units capsule Take 3 capsules (600 Units total) by mouth daily       No current facility-administered medications on file prior to visit.   No Known Allergies      Objective  /78 (BP Location: Right arm, Patient Position: Sitting, Cuff Size: Standard)   Ht 5' 4.75\" (1.645 m)   Wt 76.2 kg (168 lb)   BMI 28.17 kg/m²      Physical Exam  Appears well, no apparent distress.   Does not appear anxious or depressed.  HEENT: normocephalic, EOEMI, oral pharynx clear  Lungs CTA  CVS RRR  LE without edema or calf tenderness  Appears well, no apparent distress.   Does not appear " anxious or depressed.  Pelvic exam: atrophic external genitalia, vagina redundant with apical erosion and friability, cervix stenotic, flush, eroded. VERY difficult to see dimple/os. Unable to find with os finder after betadine and tenaculum.          DATA:  [facility] u/s 1/7/25: AV uterus 6.4 cm with EMS 2 mm with possible 5 mm endometrial irregularity and endocervical debris. R ov 1.9 cm with simple 1.1 cm cyst. L of not seen. No masses or free fluid.

## 2025-01-21 ENCOUNTER — TELEPHONE (OUTPATIENT)
Dept: OBGYN CLINIC | Facility: CLINIC | Age: 70
End: 2025-01-21

## 2025-01-21 NOTE — TELEPHONE ENCOUNTER
----- Message from Lauren Land MD sent at 2025 12:31 PM EST -----  Regarding: Hysteroscopy, D&C, albert, EUA  [facility] OB GYN Department  Surgery Scheduling Sheet    Patient Name: Luz Pardo  : 1955    Provider: Lauren Land MD     Needed: no; Language: N/A    Procedure: exam under anesthesia and operative hysteroscopy, D&C with SYmphion    Diagnosis: PMB, cervical stenosis    Special Needs or Equipment: Symphion    Anesthesia: IV sedation with anesthesia    Length of stay: outpatient  Does patient have comorbid conditions that will require close perioperative monitoring prior to safe discharge: no    The patient has comorbid conditions that will require close perioperative monitoring prior to safe discharge, including N/A.   This may require acute care beyond the usual and routine recovery period. As such, inpatient admission post-operatively is expected and appropriate, and anticipated hospital length of stay will be >2 midnights.    Pre-Admission Testing Needed: yes   Labs that should be ordered: cbc, cmp, and EKG    Order PAT that is recommended in prep for procedure?: Yes    Medical Clearance Needed: yes; Provider: Pt cardiologist    MA Form Signed (tubals/hysterectomy): Not Indicated    Surgical Drink Given: no     How many days out of work: 1 day(s)     How many days no drivin day(s)       Is pre op appt needed?  no  Interval for post op appt: n/a      For Surgical Scheduler:     Surgery Scheduled On:  [place]: [facility]    Pre-op Appt: 25  Post op Appt:  Consult/Medical clearance appt:

## 2025-01-22 ENCOUNTER — TELEPHONE (OUTPATIENT)
Age: 70
End: 2025-01-22

## 2025-01-22 NOTE — TELEPHONE ENCOUNTER
Patient is calling to see how long prior to surgery does she need to take her pessary out.  She advised she is going on a trip and will be back 2/15, she would like to know if she can keep the pessary in while away.

## 2025-01-22 NOTE — TELEPHONE ENCOUNTER
She may keep it in if needed while away. The longer it is out, the better so if she can remove it when she is less active (such as when flying) that would be ideal.  Thanks.

## 2025-02-03 ENCOUNTER — TELEPHONE (OUTPATIENT)
Age: 70
End: 2025-02-03

## 2025-02-03 NOTE — TELEPHONE ENCOUNTER
LOV:10/2/24    HX:Fatty liver, Torrez's esophagus w/o dysplasia, Hx colon polyps    Pt transferred to nurse line.  Pt is traveling out of the country on Fri 2/7/25 and would like to know what she can take along in case she gets diarrhea.  She doesn't want to take Imodium, since she had a problem with it previously- was told she shouldn't have taken it for diarrhea.     Please advise.

## 2025-02-03 NOTE — TELEPHONE ENCOUNTER
Pt called in regard to scheduling an appointment. Requested to speak to nurse call was transferred to the nurse.

## 2025-02-20 ENCOUNTER — TELEPHONE (OUTPATIENT)
Dept: OBGYN CLINIC | Facility: CLINIC | Age: 70
End: 2025-02-20

## 2025-02-20 DIAGNOSIS — N88.2 CERVICAL STENOSIS (UTERINE CERVIX): ICD-10-CM

## 2025-02-20 DIAGNOSIS — N81.11 MIDLINE CYSTOCELE: Primary | ICD-10-CM

## 2025-02-20 RX ORDER — MISOPROSTOL 100 UG/1
TABLET ORAL
Qty: 1 TABLET | Refills: 0 | Status: SHIPPED | OUTPATIENT
Start: 2025-02-20

## 2025-02-20 NOTE — TELEPHONE ENCOUNTER
Patient is requesting a refill on OXYQUINOLONE SULFATE VAGINAL (Trimo-Guardado) 0.025 % GEL.     [facility]/pharmacy #[id number] - [place] - [phone]

## 2025-02-20 NOTE — TELEPHONE ENCOUNTER
Patient did not pick it up from the pharmacy and they put it back on the shelf.     Reason for call:   [x] Refill   [] Prior Auth  [] Other:     Office:   [] PCP/Provider -   [x] Specialty/Provider - [facility]  Authorized By: Lauren Land MD      Medication: miSOPROStol (Cytotec) 100 mcg tablet    Dose/Frequency: 1/2 tablet in the vagina the night prior to the procedure; repeat with the other half the morning of the procedure.    Quantity: 1 tablet    Pharmacy: [facility]/pharmacy #[id number] - [place] - [phone]      Does the patient have enough for 3 days?   [] Yes   [x] No - Send as HP to POD     Procedure: Right Below the Knee Amputation vs. Right Above the Knee Amputation  Date: 11/30/23    - Labs:                        9.3    12.80 )-----------( 345      ( 29 Nov 2023 05:52 )             30.5     11-29    133<L>  |  100  |  13  ----------------------------<  216<H>  3.8   |  26  |  0.48<L>    Ca    8.1<L>      29 Nov 2023 05:52  Phos  2.6     11-29  Mg     1.8     11-29        Type & Screen #1: date   Type & Screen #2: date    - UA:     - CXR:    - EKG:    - AICD/Pacemaker Interrogation Date:     - Blood: Not needed.     - Pregnancy Test:     - Orders:  > NPO at midnight  > IVF at midnight  > Perioperative antibiotics not needed.  > Morning Labs: CBC, BMP, coags, type & screen, night and morning chlorhexidine bath  > Diabetic orders adjusted for NPO period.    - Permits:  > Consent in chart.  > Case scheduled with OR.    Peg Freeman PA-C   p90   Procedure: Right Below the Knee Amputation vs. Right Above the Knee Amputation  Date: 11/30/23    - Labs:                        9.3    12.80 )-----------( 345      ( 29 Nov 2023 05:52 )             30.5     11-29    133<L>  |  100  |  13  ----------------------------<  216<H>  3.8   |  26  |  0.48<L>    Ca    8.1<L>      29 Nov 2023 05:52  Phos  2.6     11-29  Mg     1.8     11-29      Type & Screen #1: date Type + Screen (11.19.23 @ 04:37)    ABO Interpretation: O   Rh Interpretation: Positive   Antibody Screen: Negative      Type & Screen #2: date pending 11/30/23    - UA: Urine Microscopic-Add On (NC) (11.17.23 @ 04:19)    Bacteria: Many /HPF   Epithelial Cells: 0 /HPF   Review: Reviewed   Cast: 2 /LPF   Red Blood Cell - Urine: 1 /HPF   White Blood Cell - Urine: 128 /HPF      - EKG: < from: 12 Lead ECG (11.18.23 @ 17:31) >    Ventricular Rate 101 BPM    Atrial Rate 101 BPM    P-R Interval 130 ms    QRS Duration 82 ms    Q-T Interval 322 ms    QTC Calculation(Bazett) 417 ms    P Axis 40 degrees    R Axis -6 degrees    T Axis 43 degrees    Diagnosis Line SINUS TACHYCARDIAWITH OCCASIONAL PREMATURE VENTRICULAR COMPLEXES  NONSPECIFIC T WAVE ABNORMALITY  ABNORMAL ECG  WHEN COMPARED WITH ECG OF 18-NOV-2023 17:30, (UNCONFIRMED)  no  SIGNIFICANT CHANGES HAVE OCCURRED  Confirmed by KATIA COSTA MD ([id number]) on 11/19/2023 12:52:23 PM    < end of copied text >      - Blood: 2 units on hold for OR       - Orders:  > NPO at midnight  > IVF at midnight per primary team   > Perioperative antibiotics not needed.  > Morning Labs: CBC, BMP, coags, type & screen, night and morning chlorhexidine bath  > Diabetic orders to be adjusted by primary team for OR       Vascular   [id number]

## 2025-02-24 ENCOUNTER — RESULTS FOLLOW-UP (OUTPATIENT)
Dept: OBGYN CLINIC | Facility: CLINIC | Age: 70
End: 2025-02-24

## 2025-02-24 PROCEDURE — NC001 PR NO CHARGE: Performed by: OBSTETRICS & GYNECOLOGY

## 2025-02-24 NOTE — H&P
Assessment & Plan  PMB (postmenopausal bleeding)  68 yo  with pessary, noted intermittent spotting with straining. Erosion noted on exam, u/s obtained.   U/s reviewed with 2 mm EMS with 5 mm irregular endometrial area noted and endocervical debris.   Known cervical stenosis, s/p LEEP.      D/w pt and attempted endo bx with inability to find external os with os finders.   Options reviewed including repeat imaging in 3 months, obtaining MRI for further characterization of endometrium, hysteroscopy D&C.   Would like definitive tissue sampling with personal h/o breast cancer and family h/o uterine cancer.     R&B of procedure reviewed including bleeding, infection, uterine perforation (quoted 1:200-500) and potential need for laparoscopy with perforation. Questions answered and consent signed.  Will rx cytotec pre op.     Orders:    Case request operating room: DILATATION AND CURETTAGE (D&C) WITH HYSTEROSCOPY; SYMPHION, EXAM UNDER ANESTHESIA; Standing    CBC and differential; Future    Comprehensive metabolic panel; Future    EKG 12 lead; Future     Cervical stenosis (uterine cervix)  Will need cytotec pre procedure  Orders:    Case request operating room: DILATATION AND CURETTAGE (D&C) WITH HYSTEROSCOPY; SYMPHION, EXAM UNDER ANESTHESIA; Standing    CBC and differential; Future    Comprehensive metabolic panel; Future    EKG 12 lead; Future           History of Present Illness  Luz Pardo is a 69 y.o. female who presents for u/s review and endometrial biopsy.     Review of Systems   Some discharge, no bleeding     Past Medical History  Medical History        Past Medical History:   Diagnosis Date    Abnormal Pap smear of cervix      Arthritis      Torrez's esophagus      Breast cancer (HCC) - left     Cholelithiasis      Colon polyp      Cystocele with rectocele       now has pessary    Fatty liver      Female infertility Early     Fracture of 5th metatarsal 2023    GERD  (gastroesophageal reflux disease)      Hemorrhoids      Hyperlipidemia      Hypothyroidism 1970's    Miscarriage       2    Osteopenia      Polycystic ovary syndrome 1970's    Pulmonary arterial hypertension (HCC) 2017?     Exercise induced    Varicella Childhood         Surgical History         Past Surgical History:   Procedure Laterality Date    BREAST BIOPSY   2009    BREAST LUMPECTOMY        CHOLECYSTECTOMY        COLONOSCOPY   07/2021     [facility]    DILATION AND CURETTAGE OF UTERUS         X2    DXA PROCEDURE (HISTORICAL)   12/2020     Osteopenia    MAMMO (HISTORICAL) Bilateral 01/11/2022     [facility] BI-RADS 2. Benign findings scattered areas fibroglandular density; 255 to 50% glandular dense breast tissue    MOHS SURGERY        [place] CONIZATION CERVIX W/WO D&C RPR KNIFE/LASER        TONSILLECTOMY        TUBAL LIGATION        UPPER GASTROINTESTINAL ENDOSCOPY             Family History         Family History   Problem Relation Age of Onset    Stroke Mother      Cancer Mother           skin    Cancer Father           liver, stomach, prostate, skin    Stomach cancer Father      Cancer Maternal Aunt           poss fallopian tube    Uterine cancer Cousin           previously thought to be ovarian    Colon cancer Neg Hx      Colon polyps Neg Hx      Breast cancer Neg Hx            reports that she quit smoking about 50 years ago. Her smoking use included cigarettes. She started smoking about 60 years ago. She has never used smokeless tobacco. She reports current alcohol use of about 16.0 - 25.0 standard drinks of alcohol per week. She reports current drug use. Drug: Marijuana.  Medications Ordered Prior to Encounter          Current Outpatient Medications on File Prior to Visit   Medication Sig Dispense Refill    Ascorbic Acid (vitamin C) 1000 MG tablet Take 1,000 mg by mouth daily        Calcium Carbonate+Vitamin D 600-200 MG-UNIT TABS Take 1 tablet by mouth every 24 hours        cholecalciferol (VITAMIN D3) 1,000  units tablet Take 2,000 Units by mouth daily        fish oil-omega-3 fatty acids 1000 MG capsule Take 1 capsule by mouth daily        IODINE EX Apply topically daily        miSOPROStol (Cytotec) 100 mcg tablet 1/2 tablet in the vagina the night prior to the procedure; repeat with the other half the morning of the procedure. 1 tablet 0    Multiple Vitamins-Minerals (CENTRUM SILVER 50+WOMEN PO) Take 1 tablet by mouth every 24 hours        Multiple Vitamins-Minerals (PRESERVISION AREDS 2 PO) Take by mouth        omeprazole (PriLOSEC) 20 mg delayed release capsule Take 1 capsule (20 mg total) by mouth daily 90 capsule 3    rosuvastatin (CRESTOR) 5 mg tablet Take 5 mg by mouth daily        vitamin k 100 MCG tablet Take 1 tablet by mouth every 24 hours        vitamin E, tocopherol, 200 units capsule Take 3 capsules (600 Units total) by mouth daily          No current facility-administered medications on file prior to visit.        Allergies   No Known Allergies        Objective  1/27/25 cardiology clearance on chart  2/19/25 CMP and CBC with creatinine 0.77 and Hg 14.8    [facility] u/s 1/7/25: AV uterus 6.4 cm with EMS 2 mm with possible 5 mm endometrial irregularity and endocervical debris. R ov 1.9 cm with simple 1.1 cm cyst. L of not seen. No masses or free fluid.

## 2025-02-25 ENCOUNTER — TELEPHONE (OUTPATIENT)
Dept: OBGYN CLINIC | Facility: CLINIC | Age: 70
End: 2025-02-25

## 2025-02-25 NOTE — TELEPHONE ENCOUNTER
----- Message from Lauren Land MD sent at 2/24/2025  4:09 PM EST -----  Regarding: Stop fish oil and tumeric supplements prior to surgery.  Please call and advise to stop her fish oil and tumeric supplements until after surgery this Thursday.   Please remind her to use the prescribed misoprostol tablet the night before and the morning of her D&C.   Thanks.

## 2025-02-26 ENCOUNTER — OFFICE VISIT (OUTPATIENT)
Dept: GASTROENTEROLOGY | Facility: CLINIC | Age: 70
End: 2025-02-26
Payer: MEDICARE

## 2025-02-26 VITALS
DIASTOLIC BLOOD PRESSURE: 76 MMHG | BODY MASS INDEX: 27.66 KG/M2 | HEIGHT: 65 IN | WEIGHT: 166 LBS | SYSTOLIC BLOOD PRESSURE: 128 MMHG

## 2025-02-26 DIAGNOSIS — K22.70 BARRETT'S ESOPHAGUS WITHOUT DYSPLASIA: ICD-10-CM

## 2025-02-26 DIAGNOSIS — K62.5 RECTAL BLEEDING: Primary | ICD-10-CM

## 2025-02-26 DIAGNOSIS — K76.0 FATTY LIVER: ICD-10-CM

## 2025-02-26 DIAGNOSIS — Z86.0100 HISTORY OF COLON POLYPS: ICD-10-CM

## 2025-02-26 PROCEDURE — G2211 COMPLEX E/M VISIT ADD ON: HCPCS | Performed by: INTERNAL MEDICINE

## 2025-02-26 PROCEDURE — 99214 OFFICE O/P EST MOD 30 MIN: CPT | Performed by: INTERNAL MEDICINE

## 2025-02-26 RX ORDER — OMEPRAZOLE 20 MG/1
20 CAPSULE, DELAYED RELEASE ORAL DAILY
Qty: 90 CAPSULE | Refills: 3 | Status: SHIPPED | OUTPATIENT
Start: 2025-02-26

## 2025-02-26 RX ORDER — HYDROCORTISONE ACETATE 25 MG/1
25 SUPPOSITORY RECTAL 2 TIMES DAILY
Qty: 20 SUPPOSITORY | Refills: 2 | Status: SHIPPED | OUTPATIENT
Start: 2025-02-26

## 2025-02-26 NOTE — PROGRESS NOTES
Name: Luz Pardo      : 1955      MRN: [id number]  Encounter Provider: Cinda Galdamez MD  Encounter Date: 2025   Encounter department: [facility]  :  Assessment & Plan  Rectal bleeding  69F here today w rectal bleeding - had some viral infection in [facility], went to the ER (10/2024) - sig diarrhea and since has had intermittent BRBPR - has calmed down a bit.     Rectal exam sig for grade 3 hemorrhoids.     Will start some anusol.  Defer hemorrhoid banding until post D&C scheduled tomorrow.     Orders:  •  hydrocortisone (ANUSOL-HC) 25 mg suppository; Insert 1 suppository (25 mg total) into the rectum 2 (two) times a day    Torrez's esophagus without dysplasia  Recall EGD 2027  Labs UTD 2025 - recall 2026  Cont omeprazole for now    Orders:  •  omeprazole (PriLOSEC) 20 mg delayed release capsule; Take 1 capsule (20 mg total) by mouth daily    Fatty liver  LFTs have been normal. F0-F1.    Limit ETOH intake.   Follow up labs 2026    Orders:  •  vitamin E, tocopherol, 200 units capsule; Take 3 capsules (600 Units total) by mouth daily    History of colon polyps  Recall 2026           History of Present Illness     Luz Pardo is a 69 y.o. female who presents today for brbpr. Pt states she was otherwise doing fine, until October, she went on vacation to [facility]. Ended up with some viral illness, n/v/d which led to her being in the ER.  Symptoms eventually improved and patient was able to continue her vacation.  However since then, her stools have been loose and intermittently she has noticed bright red blood per rectum.  2 times she had significant bleeding.  Her colonoscopy is up-to-date, last in .    History obtained from: patient    Review of Systems   All other systems reviewed and are negative.    Medical History Reviewed by provider this encounter:  Tobacco  Allergies  Meds  Problems  Med Hx  Surg Hx  Fam Hx     .    "  Objective   /76   Ht 5' 4.75\" (1.645 m)   Wt 75.3 kg (166 lb)   BMI 27.84 kg/m²      Physical Exam  Vitals and nursing note reviewed.   Constitutional:       General: She is not in acute distress.     Appearance: She is well-developed.   HENT:      Head: Normocephalic and atraumatic.   Eyes:      General: No scleral icterus.     Conjunctiva/sclera: Conjunctivae normal.   Cardiovascular:      Rate and Rhythm: Normal rate and regular rhythm.      Heart sounds: Normal heart sounds. No murmur heard.  Pulmonary:      Effort: Pulmonary effort is normal. No respiratory distress.      Breath sounds: Normal breath sounds.   Abdominal:      General: Abdomen is flat. Bowel sounds are normal. There is no distension.      Palpations: Abdomen is soft.      Tenderness: There is no abdominal tenderness.   Genitourinary:     Rectum: External hemorrhoid and internal hemorrhoid present. Abnormal anal tone.   Musculoskeletal:         General: No swelling.      Cervical back: Neck supple.   Skin:     General: Skin is warm and dry.   Neurological:      General: No focal deficit present.      Mental Status: She is alert and oriented to person, place, and time.   Psychiatric:         Mood and Affect: Mood normal.           "

## 2025-02-26 NOTE — ASSESSMENT & PLAN NOTE
Recall EGD 7/2027  Labs UTD 2/2025 - recall 2/2026  Cont omeprazole for now    Orders:  •  omeprazole (PriLOSEC) 20 mg delayed release capsule; Take 1 capsule (20 mg total) by mouth daily

## 2025-02-26 NOTE — ASSESSMENT & PLAN NOTE
69F here today w rectal bleeding - had some viral infection in [place], went to the ER (10/2024) - sig diarrhea and since has had intermittent BRBPR - has calmed down a bit.     Rectal exam sig for grade 3 hemorrhoids.     Will start some anusol.  Defer hemorrhoid banding until post D&C scheduled tomorrow.     Orders:  •  hydrocortisone (ANUSOL-HC) 25 mg suppository; Insert 1 suppository (25 mg total) into the rectum 2 (two) times a day

## 2025-02-26 NOTE — ASSESSMENT & PLAN NOTE
LFTs have been normal. F0-F1.    Limit ETOH intake.   Follow up labs 2/2026    Orders:  •  vitamin E, tocopherol, 200 units capsule; Take 3 capsules (600 Units total) by mouth daily

## 2025-02-27 ENCOUNTER — HOSPITAL ENCOUNTER (OUTPATIENT)
Facility: HOSPITAL | Age: 70
Setting detail: OUTPATIENT SURGERY
Discharge: HOME | End: 2025-02-27
Attending: OBSTETRICS & GYNECOLOGY | Admitting: OBSTETRICS & GYNECOLOGY
Payer: MEDICARE

## 2025-02-27 ENCOUNTER — ANESTHESIA EVENT (OUTPATIENT)
Dept: PERIOP | Facility: HOSPITAL | Age: 70
End: 2025-02-27
Payer: MEDICARE

## 2025-02-27 ENCOUNTER — ANESTHESIA (OUTPATIENT)
Dept: PERIOP | Facility: HOSPITAL | Age: 70
End: 2025-02-27
Payer: MEDICARE

## 2025-02-27 VITALS
SYSTOLIC BLOOD PRESSURE: 123 MMHG | RESPIRATION RATE: 22 BRPM | HEART RATE: 62 BPM | BODY MASS INDEX: 26.36 KG/M2 | DIASTOLIC BLOOD PRESSURE: 67 MMHG | WEIGHT: 164 LBS | TEMPERATURE: 97.3 F | HEIGHT: 66 IN | OXYGEN SATURATION: 99 %

## 2025-02-27 DIAGNOSIS — N88.2 CERVICAL STENOSIS (UTERINE CERVIX): ICD-10-CM

## 2025-02-27 DIAGNOSIS — N95.0 PMB (POSTMENOPAUSAL BLEEDING): ICD-10-CM

## 2025-02-27 PROCEDURE — 99024 POSTOP FOLLOW-UP VISIT: CPT | Performed by: OBSTETRICS & GYNECOLOGY

## 2025-02-27 PROCEDURE — 88305 TISSUE EXAM BY PATHOLOGIST: CPT | Performed by: PATHOLOGY

## 2025-02-27 PROCEDURE — 88344 IMHCHEM/IMCYTCHM EA MLT ANTB: CPT | Performed by: PATHOLOGY

## 2025-02-27 PROCEDURE — 58558 HYSTEROSCOPY BIOPSY: CPT | Performed by: OBSTETRICS & GYNECOLOGY

## 2025-02-27 RX ORDER — DEXAMETHASONE SODIUM PHOSPHATE 10 MG/ML
INJECTION, SOLUTION INTRAMUSCULAR; INTRAVENOUS AS NEEDED
Status: DISCONTINUED | OUTPATIENT
Start: 2025-02-27 | End: 2025-02-27

## 2025-02-27 RX ORDER — PROPOFOL 10 MG/ML
INJECTION, EMULSION INTRAVENOUS CONTINUOUS PRN
Status: DISCONTINUED | OUTPATIENT
Start: 2025-02-27 | End: 2025-02-27

## 2025-02-27 RX ORDER — CEFAZOLIN SODIUM 1 G/3ML
INJECTION, POWDER, FOR SOLUTION INTRAMUSCULAR; INTRAVENOUS AS NEEDED
Status: DISCONTINUED | OUTPATIENT
Start: 2025-02-27 | End: 2025-02-27

## 2025-02-27 RX ORDER — ACETAMINOPHEN 10 MG/ML
INJECTION, SOLUTION INTRAVENOUS AS NEEDED
Status: DISCONTINUED | OUTPATIENT
Start: 2025-02-27 | End: 2025-02-27

## 2025-02-27 RX ORDER — SODIUM CHLORIDE, SODIUM LACTATE, POTASSIUM CHLORIDE, CALCIUM CHLORIDE 600; 310; 30; 20 MG/100ML; MG/100ML; MG/100ML; MG/100ML
INJECTION, SOLUTION INTRAVENOUS CONTINUOUS PRN
Status: DISCONTINUED | OUTPATIENT
Start: 2025-02-27 | End: 2025-02-27

## 2025-02-27 RX ORDER — LIDOCAINE HYDROCHLORIDE 10 MG/ML
INJECTION, SOLUTION EPIDURAL; INFILTRATION; INTRACAUDAL; PERINEURAL AS NEEDED
Status: DISCONTINUED | OUTPATIENT
Start: 2025-02-27 | End: 2025-02-27 | Stop reason: HOSPADM

## 2025-02-27 RX ORDER — ONDANSETRON 2 MG/ML
4 INJECTION INTRAMUSCULAR; INTRAVENOUS EVERY 4 HOURS PRN
Status: DISCONTINUED | OUTPATIENT
Start: 2025-02-27 | End: 2025-02-27 | Stop reason: HOSPADM

## 2025-02-27 RX ORDER — FENTANYL CITRATE/PF 50 MCG/ML
25 SYRINGE (ML) INJECTION
Status: DISCONTINUED | OUTPATIENT
Start: 2025-02-27 | End: 2025-02-27 | Stop reason: HOSPADM

## 2025-02-27 RX ORDER — EPHEDRINE SULFATE 50 MG/ML
INJECTION INTRAVENOUS AS NEEDED
Status: DISCONTINUED | OUTPATIENT
Start: 2025-02-27 | End: 2025-02-27

## 2025-02-27 RX ORDER — FENTANYL CITRATE 50 UG/ML
INJECTION, SOLUTION INTRAMUSCULAR; INTRAVENOUS AS NEEDED
Status: DISCONTINUED | OUTPATIENT
Start: 2025-02-27 | End: 2025-02-27

## 2025-02-27 RX ORDER — ONDANSETRON 2 MG/ML
INJECTION INTRAMUSCULAR; INTRAVENOUS AS NEEDED
Status: DISCONTINUED | OUTPATIENT
Start: 2025-02-27 | End: 2025-02-27

## 2025-02-27 RX ADMIN — SODIUM CHLORIDE, SODIUM LACTATE, POTASSIUM CHLORIDE, AND CALCIUM CHLORIDE: .6; .31; .03; .02 INJECTION, SOLUTION INTRAVENOUS at 08:43

## 2025-02-27 RX ADMIN — ONDANSETRON 4 MG: 2 INJECTION INTRAMUSCULAR; INTRAVENOUS at 09:27

## 2025-02-27 RX ADMIN — DEXAMETHASONE SODIUM PHOSPHATE 5 MG: 10 INJECTION, SOLUTION INTRAMUSCULAR; INTRAVENOUS at 09:57

## 2025-02-27 RX ADMIN — FENTANYL CITRATE 50 MCG: 50 INJECTION, SOLUTION INTRAMUSCULAR; INTRAVENOUS at 09:27

## 2025-02-27 RX ADMIN — DEXMEDETOMIDINE 4 MCG: 100 INJECTION, SOLUTION, CONCENTRATE INTRAVENOUS at 09:36

## 2025-02-27 RX ADMIN — DEXMEDETOMIDINE 8 MCG: 100 INJECTION, SOLUTION, CONCENTRATE INTRAVENOUS at 09:30

## 2025-02-27 RX ADMIN — DEXMEDETOMIDINE 8 MCG: 100 INJECTION, SOLUTION, CONCENTRATE INTRAVENOUS at 09:27

## 2025-02-27 RX ADMIN — ACETAMINOPHEN 1000 MG: 10 INJECTION INTRAVENOUS at 09:37

## 2025-02-27 RX ADMIN — EPHEDRINE SULFATE 15 MG: 50 INJECTION INTRAVENOUS at 09:44

## 2025-02-27 RX ADMIN — CEFAZOLIN 2000 MG: 1 INJECTION, POWDER, FOR SOLUTION INTRAMUSCULAR; INTRAVENOUS at 10:01

## 2025-02-27 RX ADMIN — PROPOFOL 100 MCG/KG/MIN: 10 INJECTION, EMULSION INTRAVENOUS at 09:30

## 2025-02-27 NOTE — ANESTHESIA POSTPROCEDURE EVALUATION
Post-Op Assessment Note    CV Status:  Stable    Pain management: adequate       Mental Status:  Alert and awake   Hydration Status:  Euvolemic   PONV Controlled:  Controlled   Airway Patency:  Patent     Post Op Vitals Reviewed: Yes    No anethesia notable event occurred.    Staff: Anesthesiologist           Last Filed PACU Vitals:  Vitals Value Taken Time   Temp 97.3 °F (36.3 °C) 02/27/25 1006   Pulse 70 02/27/25 1033   /57 02/27/25 1026   Resp 21 02/27/25 1033   SpO2 98 % 02/27/25 1033   Vitals shown include unfiled device data.    Modified Chloé:     Vitals Value Taken Time   Activity 2 02/27/25 1016   Respiration 2 02/27/25 1016   Circulation 2 02/27/25 1016   Consciousness 2 02/27/25 1016   Oxygen Saturation 2 02/27/25 1016     Modified Chloé Score: 10

## 2025-02-27 NOTE — DISCHARGE INSTR - AVS FIRST PAGE
Nothing in vagina for 7-10 days: no sex, tampons, pessary or douching.  Call for heavy bleeding, fever, pain, any problems.   You may take your tylenol as directed on the bottle for discomfort. Your first dose should be at 4 PM.  Call if you do not hear about the results of the pathology in 2 weeks.

## 2025-02-27 NOTE — INTERVAL H&P NOTE
H&P reviewed. After examining the patient I find no changes in the patients condition since the H&P had been written.    Vitals:    02/27/25 0826   BP: 136/68   Pulse: 70   Resp: 16   Temp: (!) 97.3 °F (36.3 °C)   SpO2: 95%

## 2025-02-27 NOTE — ANESTHESIA POSTPROCEDURE EVALUATION
Post-Op Assessment Note    CV Status:  Stable  Pain Score: 0    Pain management: adequate       Mental Status:  Alert and awake   Hydration Status:  Euvolemic   PONV Controlled:  Controlled   Airway Patency:  Patent     Post Op Vitals Reviewed: Yes    No anethesia notable event occurred.    Staff: CRNA           Last Filed PACU Vitals:  Vitals Value Taken Time   Temp 97.3 °F (36.3 °C) 02/27/25 1006   Pulse 78 02/27/25 1010   BP 96/51 02/27/25 1006   Resp 17 02/27/25 1010   SpO2 95 % 02/27/25 1010   Vitals shown include unfiled device data.

## 2025-02-27 NOTE — PROGRESS NOTES
Spoke with pt NOS.   Resting well at home. No bleeding or pain.   Will RTO in 2 weeks for post op check, prn concerns.

## 2025-02-27 NOTE — OP NOTE
OPERATIVE REPORT  PATIENT NAME: Luz Pardo    :  1955  MRN: [id number]  Pt Location:  OR ROOM 04    SURGERY DATE: 2025    Surgeons and Role:     * Lauren Land MD - Primary    Preop Diagnosis:  PMB (postmenopausal bleeding) [N95.0]  Severe cervical stenosis (uterine cervix) [N88.2]    Post-Op Diagnosis Codes:     * PMB (postmenopausal bleeding) [N95.0]     * Cervical stenosis (uterine cervix) [N88.2]    Procedure(s):  Cervical D&C with endometrial sampling attempt  Limited hysteroscopy    Specimen(s):  ID Type Source Tests Collected by Time Destination   1 : endometrial sampling Tissue Endometrium TISSUE EXAM Lauren Land MD 2025 [id number]        Estimated Blood Loss:   Minimal    Drains:  * No LDAs found *    Anesthesia Type:   IV Sedation with Anesthesia, paracervical block    Operative Indications:  PMB (postmenopausal bleeding) [N95.0]  Cervical stenosis (uterine cervix) [N88.2]  Family h/o uterine cancer    Operative Findings:  Redundant vaginal with rectocele prominent. Severe cervical scarring and stenosis. Scarring on hysteroscopic evaluation.      Complications:   None    Procedure and Technique:  The patient was brought to the OR and placed in the dorsal lithotomy while awake. After successful IV sedation she was prepped and draped in the usual sterile fashion for vaginal surgery.   A speculum was placed in the vagina. The cervix was poorly visualized adjacent to prominent rectocele and apical erosion from pessary.   A bimanual exam was performed with small cervix palpated anteriorly.   The cervix was grasped with Allis clamp. A paracervical block was accomplished with 10 cc of plain lidocaine.   Lacrimal duct probe was utilized with appreciation of the cervical external os dimple.   An 11 blade was utilized to nick the dimple and lacrimal ducts were utilized to dilate the cervix. Dilators were utilized to accommodate a hysteroscope. Scarring was noted in the endocervix vs a  false passage.   The area sounded to 7 cm.   Due to concern for a perforation in the Nondenominational of a false passage, the area was sampled with an #18 jelco attached to a syringe.   Specimens were sent to pathology.   Fluid deficit was 0  The Allis and speculum were removed. Hemostasis was noted.   All sponge and instrument counts were correct. The patient tolerated the procedure well and was taken to surgical short stay in good condition.     I was present for the entire procedure.    Patient Disposition:  PACU              SIGNATURE: Lauren Land MD  DATE: February 27, 2025  TIME: 9:58 AM

## 2025-02-27 NOTE — ANESTHESIA PREPROCEDURE EVALUATION
Procedure:  HYSTEROSCOPY D+C WITH SYMPHION, EXAM UNDER ANESTHESIA (Uterus)    Relevant Problems   ANESTHESIA (within normal limits)      FEN/Gastrointestinal   (+) Torrez's esophagus without dysplasia        Physical Exam    Airway    Mallampati score: I  TM Distance: >3 FB  Neck ROM: full     Dental   No notable dental hx     Cardiovascular  Cardiovascular exam normal    Pulmonary  Pulmonary exam normal     Other Findings  post-pubertal.      Anesthesia Plan  ASA Score- 1     Anesthesia Type- IV sedation with anesthesia with ASA Monitors.         Additional Monitors:     Airway Plan:     Comment: I discussed risks (reviewed with patient on the anesthesia consent form), benefits and alternatives of monitored sedation including the possibility under sedation to have recall or mild discomfort.    General anesthesia for intolerance to sedation consented.       Plan Factors-    Chart reviewed.    Patient summary reviewed.                  Induction- intravenous.    Postoperative Plan- Plan for postoperative opioid use.         Informed Consent- Anesthetic plan and risks discussed with patient.  I personally reviewed this patient with the CRNA. Discussed and agreed on the Anesthesia Plan with the CRNA..      NPO Status:  Vitals Value Taken Time   Date of last liquid 02/27/25 02/27/25 0811   Time of last liquid 0630 02/27/25 0811   Date of last solid 02/26/25 02/27/25 0811   Time of last solid 1830 02/27/25 0811

## 2025-03-05 PROCEDURE — 88305 TISSUE EXAM BY PATHOLOGIST: CPT | Performed by: PATHOLOGY

## 2025-03-05 PROCEDURE — 88344 IMHCHEM/IMCYTCHM EA MLT ANTB: CPT | Performed by: PATHOLOGY

## 2025-03-07 ENCOUNTER — RESULTS FOLLOW-UP (OUTPATIENT)
Dept: OBGYN CLINIC | Facility: CLINIC | Age: 70
End: 2025-03-07

## 2025-03-12 NOTE — PROGRESS NOTES
Name: Luz Pardo      : 1955      MRN: [id number]  Encounter Provider: Lauren Land MD  Encounter Date: 3/13/2025   Encounter department: [facility]  :  Assessment & Plan  Cervical stenosis (uterine cervix)  68 yo with cystocele managed with pessary had discharge with intermittent spotting with straining. No heavy bleeding, vaginal erosion and pinpoint scarred cervix noted on exam.   Pelvic u/s ordered to evaluate endometrium with small uterus and EMS 2 mm with 5 mm endometrial irregularity and endocervical possible debris.     Offered f/u imaging vs D&C, pt chose latter.   Despite Cytotec and use of lacrimal duct probes, unable to dilate in OR with probable false passage encountered.   Pathology from procedure benign scant squamous epithelium.    Pt feels fine after procedure, happy without pessary, no discharge or bleeding. Normal exam.   Offered repeat imaging in 6-12 months vs second opinion with gyn onc.   Also has option of prolapse surgery with uro-gyn which would remove the uterus for pathological review.   Low suspicion for uterine neoplasia with 2 mm endometrium, 5 mm endocervical collection easily explained by stenosis.   Pt would like to follow up the u/s in 6 months, RTO after for well check and review.   Warnings given to call with bleeding, other concerns or questions.  Orders:    US pelvis complete w transvaginal; Future    History of LEEP vs cone biopsy of cervix         History of Present Illness   HPI  Luz Pardo is a 69 y.o. female who presents for post op check after D&C inconclusive secondary to stenosis.    Review of Systems No PMB, no pain, fever or problems.    Past Medical History   Past Medical History:   Diagnosis Date    Abnormal Pap smear of cervix     Arthritis     Torrez's esophagus     Breast cancer (HCC) - left     Cholelithiasis     Colon polyp     Cystocele with rectocele     now has pessary    Fatty  liver     Female infertility Early 1980's    Fracture of 5th metatarsal 11/28/2023    GERD (gastroesophageal reflux disease)     Hemorrhoids     Hyperlipidemia     Hypothyroidism 1970's    Miscarriage     2    Osteopenia     Polycystic ovary syndrome 1970's    Pulmonary arterial hypertension (HCC) 2017?    Exercise induced    Pulmonary hypertension (HCC)     exercise induced    Varicella Childhood     Past Surgical History:   Procedure Laterality Date    BREAST BIOPSY  2009    BREAST LUMPECTOMY      CHOLECYSTECTOMY      COLONOSCOPY  07/2021    [facility]    DILATION AND CURETTAGE OF UTERUS      X2    DXA PROCEDURE (HISTORICAL)  12/2020    Osteopenia    MOHS SURGERY      [place] CONIZATION CERVIX W/WO D&C RPR KNIFE/LASER      in 50's; cone vs LEEP    [place] HYSTEROSCOPY BX ENDOMETRIUM&/POLYPC W/WO D&C N/A 02/27/2025    Procedure: Cervical D&C;  Surgeon: Lauren Land MD;  Location: [facility];  Service: Gynecology    TONSILLECTOMY      TUBAL LIGATION      UPPER GASTROINTESTINAL ENDOSCOPY       Family History   Problem Relation Age of Onset    Stroke Mother     Cancer Mother         skin    Cancer Father         liver, stomach, prostate, skin    Stomach cancer Father     Cancer Maternal Aunt         poss fallopian tube    Uterine cancer Cousin         previously thought to be ovarian    Colon cancer Neg Hx     Colon polyps Neg Hx     Breast cancer Neg Hx       reports that she quit smoking about 50 years ago. Her smoking use included cigarettes. She started smoking about 60 years ago. She has never used smokeless tobacco. She reports current alcohol use of about 16.0 - 25.0 standard drinks of alcohol per week. She reports current drug use. Drug: Marijuana.  Current Outpatient Medications   Medication Instructions    Calcium Carbonate+Vitamin D 600-200 MG-UNIT TABS 1 tablet, Every 24 hours    cholecalciferol (VITAMIN D3) 2,000 Units, Daily    fish oil-omega-3 fatty acids 1000 MG capsule 1 capsule, Daily    hydrocortisone  (ANUSOL-HC) 25 mg, Rectal, 2 times daily    IODINE EX Daily    Multiple Vitamins-Minerals (CENTRUM SILVER 50+WOMEN PO) 1 tablet, Every 24 hours    Multiple Vitamins-Minerals (PRESERVISION AREDS 2 PO) Oral    omeprazole (PRILOSEC) 20 mg, Oral, Daily    Oxyquinoline-Sod Lauryl Sulf 0.025-0.01 % GEL 1 Application, Vaginal, Weekly    rosuvastatin (CRESTOR) 5 mg, Every evening    vitamin C 1,000 mg, Daily    vitamin E (tocopherol) 600 Units, Oral, Daily    vitamin k 100 MCG tablet 1 tablet, Every 24 hours   No Known Allergies      Objective   /74   Wt 74.4 kg (164 lb)   BMI 26.47 kg/m²      Physical Exam  Appears well, no apparent distress.   Does not appear anxious or depressed.  Pelvic exam: atrophic external genitalia, vagina redundant with cystocele, no erosion seen,  cervix pin point, uterus small, nontender, no adnexal masses, nontender  Rectal deferred    DATA:  2/27/25 Endometrium, biopsy:  - Scant squamous epithelium.  - No dysplasia or cancer is identified, supported by multiplex [id number] immunostain.  - No definite endometrium present, Re-biopsy as clinically indicated.  - Multiple step sections are examined.    [facility] u/s 1/7/25: AV uterus 6.4 cm with EMS 2 mm with possible 5 mm endometrial irregularity and endocervical debris. R ov 1.9 cm with simple 1.1 cm cyst. L of not seen. No masses or free fluid.

## 2025-03-12 NOTE — ASSESSMENT & PLAN NOTE
70 yo with cystocele managed with pessary had discharge with intermittent spotting with straining. No heavy bleeding, vaginal erosion and pinpoint scarred cervix noted on exam.   Pelvic u/s ordered to evaluate endometrium with small uterus and EMS 2 mm with 5 mm endometrial irregularity and endocervical possible debris.     Offered f/u imaging vs D&C, pt chose latter.   Despite Cytotec and use of lacrimal duct probes, unable to dilate in OR with probable false passage encountered.   Pathology from procedure benign scant squamous epithelium.    Pt feels fine after procedure, happy without pessary, no discharge or bleeding. Normal exam.   Offered repeat imaging in 6-12 months vs second opinion with gyn onc.   Also has option of prolapse surgery with uro-gyn which would remove the uterus for pathological review.   Low suspicion for uterine neoplasia with 2 mm endometrium, 5 mm endocervical collection easily explained by stenosis.   Pt would like to follow up the u/s in 6 months, RTO after for well check and review.   Warnings given to call with bleeding, other concerns or questions.  Orders:    US pelvis complete w transvaginal; Future

## 2025-03-13 ENCOUNTER — OFFICE VISIT (OUTPATIENT)
Dept: OBGYN CLINIC | Facility: CLINIC | Age: 70
End: 2025-03-13

## 2025-03-13 VITALS — SYSTOLIC BLOOD PRESSURE: 122 MMHG | DIASTOLIC BLOOD PRESSURE: 74 MMHG | BODY MASS INDEX: 26.47 KG/M2 | WEIGHT: 164 LBS

## 2025-03-13 DIAGNOSIS — Z98.890 HISTORY OF LOOP ELECTRICAL EXCISION PROCEDURE (LEEP): ICD-10-CM

## 2025-03-13 DIAGNOSIS — N88.2 CERVICAL STENOSIS (UTERINE CERVIX): Primary | ICD-10-CM

## 2025-03-13 PROCEDURE — 99024 POSTOP FOLLOW-UP VISIT: CPT | Performed by: OBSTETRICS & GYNECOLOGY

## 2025-03-13 NOTE — LETTER
2025     [place]  [address]  [facility] [address]    Patient: Luz Pardo   YOB: 1955   Date of Visit: 3/13/2025       Dear Carla:    Luz Pardo was in to see me today for her post op check. Below are my notes for this visit.    If you have questions, please do not hesitate to call me. I look forward to following your patient along with you.         Sincerely,        Lauren Land MD        CC: No Recipients    Lauren Land MD  3/13/2025  2:40 PM  Sign when Signing Visit  Name: Luz Pardo      : 1955      MRN: [id number]  Encounter Provider: Lauren Land MD  Encounter Date: 3/13/2025   Encounter department: [facility] [place]  :  Assessment & Plan  Cervical stenosis (uterine cervix)  70 yo with cystocele managed with pessary had discharge with intermittent spotting with straining. No heavy bleeding, vaginal erosion and pinpoint scarred cervix noted on exam.   Pelvic u/s ordered to evaluate endometrium with small uterus and EMS 2 mm with 5 mm endometrial irregularity and endocervical possible debris.     Offered f/u imaging vs D&C, pt chose latter.   Despite Cytotec and use of lacrimal duct probes, unable to dilate in OR with probable false passage encountered.   Pathology from procedure benign scant squamous epithelium.    Pt feels fine after procedure, happy without pessary, no discharge or bleeding. Normal exam.   Offered repeat imaging in 6-12 months vs second opinion with gyn onc.   Also has option of prolapse surgery with uro-gyn which would remove the uterus for pathological review.   Low suspicion for uterine neoplasia with 2 mm endometrium, 5 mm endocervical collection easily explained by stenosis.   Pt would like to follow up the u/s in 6 months, RTO after for well check and review.   Warnings given to call with bleeding, other concerns or questions.  Orders:  •  US pelvis complete w transvaginal;  Future    History of LEEP vs cone biopsy of cervix         History of Present Illness  HPI  Luz Pardo is a 69 y.o. female who presents for post op check after D&C inconclusive secondary to stenosis.    Review of Systems No PMB, no pain, fever or problems.    Past Medical History  Past Medical History:   Diagnosis Date   • Abnormal Pap smear of cervix    • Arthritis    • Torrez's esophagus    • Breast cancer (HCC) - left 2009   • Cholelithiasis    • Colon polyp    • Cystocele with rectocele     now has pessary   • Fatty liver    • Female infertility Early 1980's   • Fracture of 5th metatarsal 11/28/2023   • GERD (gastroesophageal reflux disease)    • Hemorrhoids    • Hyperlipidemia    • Hypothyroidism 1970's   • Miscarriage     2   • Osteopenia    • Polycystic ovary syndrome 1970's   • Pulmonary arterial hypertension (HCC) 2017?    Exercise induced   • Pulmonary hypertension (HCC)     exercise induced   • Varicella Childhood     Past Surgical History:   Procedure Laterality Date   • BREAST BIOPSY  2009   • BREAST LUMPECTOMY     • CHOLECYSTECTOMY     • COLONOSCOPY  07/2021    [facility]   • DILATION AND CURETTAGE OF UTERUS      X2   • DXA PROCEDURE (HISTORICAL)  12/2020    Osteopenia   • MOHS SURGERY     • IA CONIZATION CERVIX W/WO D&C RPR KNIFE/LASER      in 50's; cone vs LEEP   • IA HYSTEROSCOPY BX ENDOMETRIUM&/POLYPC W/WO D&C N/A 02/27/2025    Procedure: Cervical D&C;  Surgeon: Lauren Land MD;  Location:  MAIN OR;  Service: Gynecology   • TONSILLECTOMY     • TUBAL LIGATION     • UPPER GASTROINTESTINAL ENDOSCOPY       Family History   Problem Relation Age of Onset   • Stroke Mother    • Cancer Mother         skin   • Cancer Father         liver, stomach, prostate, skin   • Stomach cancer Father    • Cancer Maternal Aunt         poss fallopian tube   • Uterine cancer Cousin         previously thought to be ovarian   • Colon cancer Neg Hx    • Colon polyps Neg Hx    • Breast cancer Neg Hx        reports that she quit smoking about 50 years ago. Her smoking use included cigarettes. She started smoking about 60 years ago. She has never used smokeless tobacco. She reports current alcohol use of about 16.0 - 25.0 standard drinks of alcohol per week. She reports current drug use. Drug: Marijuana.  Current Outpatient Medications   Medication Instructions   • Calcium Carbonate+Vitamin D 600-200 MG-UNIT TABS 1 tablet, Every 24 hours   • cholecalciferol (VITAMIN D3) 2,000 Units, Daily   • fish oil-omega-3 fatty acids 1000 MG capsule 1 capsule, Daily   • hydrocortisone (ANUSOL-HC) 25 mg, Rectal, 2 times daily   • IODINE EX Daily   • Multiple Vitamins-Minerals (CENTRUM SILVER 50+WOMEN PO) 1 tablet, Every 24 hours   • Multiple Vitamins-Minerals (PRESERVISION AREDS 2 PO) Oral   • omeprazole (PRILOSEC) 20 mg, Oral, Daily   • Oxyquinoline-Sod Lauryl Sulf 0.025-0.01 % GEL 1 Application, Vaginal, Weekly   • rosuvastatin (CRESTOR) 5 mg, Every evening   • vitamin C 1,000 mg, Daily   • vitamin E (tocopherol) 600 Units, Oral, Daily   • vitamin k 100 MCG tablet 1 tablet, Every 24 hours   No Known Allergies      Objective  /74   Wt 74.4 kg (164 lb)   BMI 26.47 kg/m²      Physical Exam  Appears well, no apparent distress.   Does not appear anxious or depressed.  Pelvic exam: atrophic external genitalia, vagina redundant with cystocele, no erosion seen,  cervix pin point, uterus small, nontender, no adnexal masses, nontender  Rectal deferred    DATA:  2/27/25 Endometrium, biopsy:  - Scant squamous epithelium.  - No dysplasia or cancer is identified, supported by multiplex p16/Ki67 immunostain.  - No definite endometrium present, Re-biopsy as clinically indicated.  - Multiple step sections are examined.    [facility] u/s 1/7/25: AV uterus 6.4 cm with EMS 2 mm with possible 5 mm endometrial irregularity and endocervical debris. R ov 1.9 cm with simple 1.1 cm cyst. L of not seen. No masses or free fluid.

## 2025-04-04 ENCOUNTER — OFFICE VISIT (OUTPATIENT)
Dept: GASTROENTEROLOGY | Facility: CLINIC | Age: 70
End: 2025-04-04
Payer: MEDICARE

## 2025-04-04 VITALS
HEIGHT: 66 IN | BODY MASS INDEX: 27.13 KG/M2 | SYSTOLIC BLOOD PRESSURE: 114 MMHG | DIASTOLIC BLOOD PRESSURE: 70 MMHG | WEIGHT: 168.8 LBS

## 2025-04-04 DIAGNOSIS — K64.2 GRADE III HEMORRHOIDS: Primary | ICD-10-CM

## 2025-04-04 PROCEDURE — PBNCHG PB NO CHARGE PLACEHOLDER: Performed by: INTERNAL MEDICINE

## 2025-04-04 PROCEDURE — 46221 LIGATION OF HEMORRHOID(S): CPT | Performed by: INTERNAL MEDICINE

## 2025-04-04 NOTE — PROGRESS NOTES
[facility] - Hemorrhoid Banding Luz Pardo 69 y.o. female MRN: [id number]  Encounter: [id number]    ASSESSMENT AND PLAN:    The left lateral hemorrhoid area was banded today. The patient was instructed to avoid constipation and straining, and educated in appropriate fiber intake.     HPI: Luz Pardo is a 69 y.o. year old female who presents with rectal bleeding due to hemorrhoids.     Previous treatments include: Banding, OTC  Bands were previously placed: Left lateral, right anterior in 2022  Current Fiber intake: Dietary  Complications of prior therapy include: None    The patient provided consent for banding  and was placed in the left lateral position  Rectal exam showed grade 3 hemorrhoids  The [place] ligator was advanced and a band was applied without difficulty   Repeat rectal exam confirmed appropriate placement  The patient was discharged home after observation.

## 2025-04-29 ENCOUNTER — OFFICE VISIT (OUTPATIENT)
Dept: GASTROENTEROLOGY | Facility: CLINIC | Age: 70
End: 2025-04-29
Payer: MEDICARE

## 2025-04-29 VITALS
BODY MASS INDEX: 26.68 KG/M2 | DIASTOLIC BLOOD PRESSURE: 74 MMHG | WEIGHT: 166 LBS | HEIGHT: 66 IN | SYSTOLIC BLOOD PRESSURE: 116 MMHG

## 2025-04-29 DIAGNOSIS — K64.2 GRADE III HEMORRHOIDS: Primary | ICD-10-CM

## 2025-04-29 PROCEDURE — PBNCHG PB NO CHARGE PLACEHOLDER: Performed by: INTERNAL MEDICINE

## 2025-04-29 PROCEDURE — 46221 LIGATION OF HEMORRHOID(S): CPT | Performed by: INTERNAL MEDICINE

## 2025-04-29 NOTE — PROGRESS NOTES
[facility] - Hemorrhoid Banding Luz Pardo 69 y.o. female MRN: [id number]  Encounter: [id number]    ASSESSMENT AND PLAN:    The right anterior hemorrhoid area was banded today. The patient was instructed to avoid constipation and straining, and educated in appropriate fiber intake.        HPI: Luz Pardo is a 69 y.o. year old female who presents with rectal bleeding due to hemorrhoids.      Previous treatments include: Banding, OTC  Bands were previously placed: 2022: LL, RA. 2025: LL  Current Fiber intake: Dietary  Complications of prior therapy include: None     The patient provided consent for banding  and was placed in the left lateral position  Rectal exam showed grade 3 hemorrhoids  The [place] ligator was advanced and a band was applied without difficulty   Repeat rectal exam confirmed appropriate placement  The patient was discharged home after observation.

## 2025-05-15 ENCOUNTER — OFFICE VISIT (OUTPATIENT)
Dept: GASTROENTEROLOGY | Facility: CLINIC | Age: 70
End: 2025-05-15
Payer: MEDICARE

## 2025-05-15 VITALS
WEIGHT: 167 LBS | DIASTOLIC BLOOD PRESSURE: 76 MMHG | HEIGHT: 66 IN | SYSTOLIC BLOOD PRESSURE: 129 MMHG | BODY MASS INDEX: 26.84 KG/M2

## 2025-05-15 DIAGNOSIS — K64.2 GRADE III HEMORRHOIDS: Primary | ICD-10-CM

## 2025-05-15 PROCEDURE — 46221 LIGATION OF HEMORRHOID(S): CPT | Performed by: INTERNAL MEDICINE

## 2025-05-15 PROCEDURE — PBNCHG PB NO CHARGE PLACEHOLDER: Performed by: INTERNAL MEDICINE

## 2025-05-15 RX ORDER — FLUOCINONIDE TOPICAL SOLUTION USP, 0.05% 0.5 MG/ML
0.5 SOLUTION TOPICAL DAILY
COMMUNITY
Start: 2025-03-14

## 2025-05-15 NOTE — PROGRESS NOTES
[facility] - Hemorrhoid Banding Luz Pardo 69 y.o. female MRN: [id number]  Encounter: [id number]    ASSESSMENT AND PLAN:    The right anterior hemorrhoid area was banded today. The patient was instructed to avoid constipation and straining, and educated in appropriate fiber intake.     HPI: Luz Pardo is a 69 y.o. year old female who presents with rectal bleeding due to hemorrhoids.      Previous treatments include: Banding, OTC  Bands were previously placed: 2022: LL, RA. 2025: LL, RA  Current Fiber intake: Dietary  Complications of prior therapy include: diarrhea causing band to fall off within 24hrs.     The patient provided consent for banding  and was placed in the left lateral position  Rectal exam showed grade 3 hemorrhoids  The [place] ligator was advanced and a band was applied without difficulty   Repeat rectal exam confirmed appropriate placement  The patient was discharged home after observation.

## 2025-05-27 ENCOUNTER — NURSE TRIAGE (OUTPATIENT)
Age: 70
End: 2025-05-27

## 2025-05-27 DIAGNOSIS — R19.7 DIARRHEA OF PRESUMED INFECTIOUS ORIGIN: Primary | ICD-10-CM

## 2025-05-27 NOTE — TELEPHONE ENCOUNTER
"REASON FOR CONVERSATION: Diarrhea    SYMPTOMS: Watery diarrhea since 5/25/25, 3 times/day    OTHER HEALTH INFORMATION: Pt has been following clear liquid diet.  She has not tried Imodium because in the past was instructed not to take it by provider.    PROTOCOL DISPOSITION: Discuss with Provider and Call Back Patient (overriding See Within 2 Weeks in Office)    CARE ADVICE PROVIDED: Discuss with provider and call back    PRACTICE FOLLOW-UP: Please advise.    Reason for Disposition   Diarrhea is a chronic symptom (recurrent or ongoing AND lasting > 4 weeks)    Answer Assessment - Initial Assessment Questions  1. DIARRHEA SEVERITY: \"How bad is the diarrhea?\" \"How many more stools have you had in the past 24 hours than normal?\"       3 times/day  2. ONSET: \"When did the diarrhea begin?\"       5/25/25  3. STOOL DESCRIPTION:  \"How loose or watery is the diarrhea?\" \"What is the stool color?\" \"Is there any blood or mucous in the stool?\"      watery  4. VOMITING: \"Are you also vomiting?\" If Yes, ask: \"How many times in the past 24 hours?\"       No   5. ABDOMEN PAIN: \"Are you having any abdomen pain?\" If Yes, ask: \"What does it feel like?\" (e.g., crampy, dull, intermittent, constant)       No   6. ABDOMEN PAIN SEVERITY: If present, ask: \"How bad is the pain?\"  (e.g., Scale 1-10; mild, moderate, or severe)      N/a  7. ORAL INTAKE: If vomiting, \"Have you been able to drink liquids?\" \"How much liquids have you had in the past 24 hours?\"      No concerns  8. HYDRATION: \"Any signs of dehydration?\" (e.g., dry mouth [not just dry lips], too weak to stand, dizziness, new weight loss) \"When did you last urinate?\"      No concerns  9. EXPOSURE: \"Have you traveled to a foreign country recently?\" \"Have you been exposed to anyone with diarrhea?\" \"Could you have eaten any food that was spoiled?\"      No   10. ANTIBIOTIC USE: \"Are you taking antibiotics now or have you taken antibiotics in the past 2 months?\"        No  11. OTHER " "SYMPTOMS: \"Do you have any other symptoms?\" (e.g., fever, blood in stool)        None    Protocols used: Diarrhea-Adult-[place]    "

## 2025-05-28 NOTE — TELEPHONE ENCOUNTER
Recommend obtaining bloodwork and stool studies. If cdiff neg, ok to try some imodium and f/u in the office.

## 2025-06-04 ENCOUNTER — RESULTS FOLLOW-UP (OUTPATIENT)
Dept: GASTROENTEROLOGY | Facility: CLINIC | Age: 70
End: 2025-06-04

## 2025-07-16 ENCOUNTER — OFFICE VISIT (OUTPATIENT)
Dept: GASTROENTEROLOGY | Facility: CLINIC | Age: 70
End: 2025-07-16
Payer: MEDICARE

## 2025-07-16 VITALS
BODY MASS INDEX: 27.16 KG/M2 | DIASTOLIC BLOOD PRESSURE: 70 MMHG | HEIGHT: 66 IN | WEIGHT: 169 LBS | SYSTOLIC BLOOD PRESSURE: 126 MMHG

## 2025-07-16 DIAGNOSIS — K64.2 GRADE III HEMORRHOIDS: Primary | ICD-10-CM

## 2025-07-16 PROCEDURE — 46221 LIGATION OF HEMORRHOID(S): CPT | Performed by: INTERNAL MEDICINE

## 2025-07-16 PROCEDURE — PBNCHG PB NO CHARGE PLACEHOLDER: Performed by: INTERNAL MEDICINE

## 2025-07-16 RX ORDER — LECITHIN/PYRIDOXINE/KELP
1 TABLET ORAL EVERY 24 HOURS
COMMUNITY

## 2025-07-16 NOTE — PROGRESS NOTES
[facility] - Hemorrhoid Banding Luz Pardo 69 y.o. female MRN: [id number]  Encounter: [id number]    ASSESSMENT AND PLAN:    The right posterior hemorrhoid area was banded today. The patient was instructed to avoid constipation and straining, and educated in appropriate fiber intake.     HPI: Luz Pardo is a 69 y.o. year old female who presents with rectal bleeding due to hemorrhoids.     Previous treatments include: Banding, OTC medications  Bands were previously placed: In 2022, left lateral, right anterior.  In 2025, left lateral, right anterior x 2  Current Fiber intake: Dietary  Complications of prior therapy include: Diarrhea causing patient to follow-up within 24 hours    The patient provided consent for banding  and was placed in the left lateral position  Rectal exam showed grade 3 hemorrhoids  The [place] ligator was advanced and a band was applied without difficulty   Repeat rectal exam confirmed appropriate placement  The patient was discharged home after observation.

## 2025-07-30 ENCOUNTER — HOSPITAL ENCOUNTER (OUTPATIENT)
Age: 70
Discharge: HOME | End: 2025-07-30
Attending: OBSTETRICS & GYNECOLOGY
Payer: MEDICARE

## 2025-07-30 DIAGNOSIS — N88.2 STRICTURE AND STENOSIS OF CERVIX: ICD-10-CM

## 2025-07-30 PROCEDURE — 76830 TRANSVAGINAL US NON-OB: CPT

## 2025-07-30 PROCEDURE — 76856 US EXAM PELVIC COMPLETE: CPT

## 2025-08-12 ENCOUNTER — TELEPHONE (OUTPATIENT)
Age: 70
End: 2025-08-12

## (undated) DEVICE — 1820 FOAM BLOCK NEEDLE COUNTER: Brand: DEVON

## (undated) DEVICE — LIGHT GLOVE GREEN

## (undated) DEVICE — MAT ABSORBANT ARTHROSCOPY FLOOR 46 X 40 IN

## (undated) DEVICE — POV-IOD SOLUTION 4OZ BT

## (undated) DEVICE — TISSUE REMOVAL SYSTEM FLUID MANAGEMENT ACCESSORIES: Brand: SYMPHION

## (undated) DEVICE — NEEDLE SPINAL 22G X 3.5IN  QUINCKE

## (undated) DEVICE — SYRINGE 10ML LL CONTROL TOP

## (undated) DEVICE — STRL ALLENTOWN HYSTEROSCOPY PK: Brand: CARDINAL HEALTH

## (undated) DEVICE — PAD SANITARY

## (undated) DEVICE — PREMIUM DRY TRAY LF: Brand: MEDLINE INDUSTRIES, INC.

## (undated) DEVICE — GLOVE SRG LF STRL BGL SKNSNS 7.5 PF

## (undated) DEVICE — LUBRICANT JELLY SURGILUBE TUBE 4OZ FLIP TOP